# Patient Record
Sex: MALE | Race: WHITE | NOT HISPANIC OR LATINO
[De-identification: names, ages, dates, MRNs, and addresses within clinical notes are randomized per-mention and may not be internally consistent; named-entity substitution may affect disease eponyms.]

---

## 2021-06-25 ENCOUNTER — APPOINTMENT (OUTPATIENT)
Dept: OPHTHALMOLOGY | Facility: CLINIC | Age: 76
End: 2021-06-25
Payer: COMMERCIAL

## 2021-06-25 ENCOUNTER — NON-APPOINTMENT (OUTPATIENT)
Age: 76
End: 2021-06-25

## 2021-06-25 PROCEDURE — 92015 DETERMINE REFRACTIVE STATE: CPT

## 2021-06-25 PROCEDURE — 92014 COMPRE OPH EXAM EST PT 1/>: CPT

## 2021-06-25 PROCEDURE — 92134 CPTRZ OPH DX IMG PST SGM RTA: CPT

## 2021-07-07 ENCOUNTER — NON-APPOINTMENT (OUTPATIENT)
Age: 76
End: 2021-07-07

## 2021-07-07 ENCOUNTER — APPOINTMENT (OUTPATIENT)
Dept: OPHTHALMOLOGY | Facility: CLINIC | Age: 76
End: 2021-07-07
Payer: COMMERCIAL

## 2021-07-07 PROCEDURE — 99072 ADDL SUPL MATRL&STAF TM PHE: CPT

## 2021-07-07 PROCEDURE — 92014 COMPRE OPH EXAM EST PT 1/>: CPT

## 2021-07-27 ENCOUNTER — NON-APPOINTMENT (OUTPATIENT)
Age: 76
End: 2021-07-27

## 2021-07-27 ENCOUNTER — APPOINTMENT (OUTPATIENT)
Dept: OPHTHALMOLOGY | Facility: CLINIC | Age: 76
End: 2021-07-27
Payer: COMMERCIAL

## 2021-07-27 PROCEDURE — 92014 COMPRE OPH EXAM EST PT 1/>: CPT

## 2021-08-19 ENCOUNTER — APPOINTMENT (OUTPATIENT)
Dept: OPHTHALMOLOGY | Facility: CLINIC | Age: 76
End: 2021-08-19
Payer: COMMERCIAL

## 2021-08-19 ENCOUNTER — NON-APPOINTMENT (OUTPATIENT)
Age: 76
End: 2021-08-19

## 2021-08-19 PROCEDURE — 92136 OPHTHALMIC BIOMETRY: CPT

## 2021-08-19 PROCEDURE — 92014 COMPRE OPH EXAM EST PT 1/>: CPT

## 2021-08-30 ENCOUNTER — APPOINTMENT (OUTPATIENT)
Dept: OPHTHALMOLOGY | Facility: HOSPITAL | Age: 76
End: 2021-08-30
Payer: COMMERCIAL

## 2021-08-30 ENCOUNTER — NON-APPOINTMENT (OUTPATIENT)
Age: 76
End: 2021-08-30

## 2021-08-30 PROCEDURE — 66984 XCAPSL CTRC RMVL W/O ECP: CPT | Mod: LT

## 2021-08-31 ENCOUNTER — APPOINTMENT (OUTPATIENT)
Dept: OPHTHALMOLOGY | Facility: CLINIC | Age: 76
End: 2021-08-31
Payer: COMMERCIAL

## 2021-08-31 ENCOUNTER — NON-APPOINTMENT (OUTPATIENT)
Age: 76
End: 2021-08-31

## 2021-08-31 PROCEDURE — 99024 POSTOP FOLLOW-UP VISIT: CPT

## 2021-09-09 ENCOUNTER — NON-APPOINTMENT (OUTPATIENT)
Age: 76
End: 2021-09-09

## 2021-09-09 ENCOUNTER — APPOINTMENT (OUTPATIENT)
Dept: OPHTHALMOLOGY | Facility: CLINIC | Age: 76
End: 2021-09-09
Payer: COMMERCIAL

## 2021-09-09 PROCEDURE — 92014 COMPRE OPH EXAM EST PT 1/>: CPT | Mod: 24

## 2021-09-09 PROCEDURE — 92136 OPHTHALMIC BIOMETRY: CPT | Mod: 26,RT

## 2021-09-20 ENCOUNTER — APPOINTMENT (OUTPATIENT)
Dept: OPHTHALMOLOGY | Facility: HOSPITAL | Age: 76
End: 2021-09-20
Payer: COMMERCIAL

## 2021-09-20 ENCOUNTER — NON-APPOINTMENT (OUTPATIENT)
Age: 76
End: 2021-09-20

## 2021-09-20 PROCEDURE — 66984 XCAPSL CTRC RMVL W/O ECP: CPT | Mod: 79,RT

## 2021-09-21 ENCOUNTER — NON-APPOINTMENT (OUTPATIENT)
Age: 76
End: 2021-09-21

## 2021-09-21 ENCOUNTER — APPOINTMENT (OUTPATIENT)
Dept: OPHTHALMOLOGY | Facility: CLINIC | Age: 76
End: 2021-09-21
Payer: COMMERCIAL

## 2021-09-21 PROCEDURE — 99024 POSTOP FOLLOW-UP VISIT: CPT

## 2021-09-28 ENCOUNTER — NON-APPOINTMENT (OUTPATIENT)
Age: 76
End: 2021-09-28

## 2021-09-28 ENCOUNTER — APPOINTMENT (OUTPATIENT)
Dept: OPHTHALMOLOGY | Facility: CLINIC | Age: 76
End: 2021-09-28
Payer: COMMERCIAL

## 2021-09-28 PROCEDURE — 99024 POSTOP FOLLOW-UP VISIT: CPT

## 2021-11-03 PROBLEM — Z00.00 ENCOUNTER FOR PREVENTIVE HEALTH EXAMINATION: Status: ACTIVE | Noted: 2021-11-03

## 2021-11-04 ENCOUNTER — APPOINTMENT (OUTPATIENT)
Dept: OPHTHALMOLOGY | Facility: CLINIC | Age: 76
End: 2021-11-04
Payer: COMMERCIAL

## 2021-11-04 ENCOUNTER — NON-APPOINTMENT (OUTPATIENT)
Age: 76
End: 2021-11-04

## 2021-11-04 PROCEDURE — 92015 DETERMINE REFRACTIVE STATE: CPT

## 2021-11-04 PROCEDURE — 99024 POSTOP FOLLOW-UP VISIT: CPT

## 2022-01-13 ENCOUNTER — APPOINTMENT (OUTPATIENT)
Dept: OPHTHALMOLOGY | Facility: CLINIC | Age: 77
End: 2022-01-13

## 2023-03-20 ENCOUNTER — INPATIENT (INPATIENT)
Facility: HOSPITAL | Age: 78
LOS: 6 days | Discharge: HOME HEALTH | DRG: 235 | End: 2023-03-27
Attending: THORACIC SURGERY (CARDIOTHORACIC VASCULAR SURGERY) | Admitting: THORACIC SURGERY (CARDIOTHORACIC VASCULAR SURGERY)
Payer: MEDICARE

## 2023-03-20 ENCOUNTER — TRANSCRIPTION ENCOUNTER (OUTPATIENT)
Age: 78
End: 2023-03-20

## 2023-03-20 VITALS
TEMPERATURE: 99 F | OXYGEN SATURATION: 94 % | DIASTOLIC BLOOD PRESSURE: 76 MMHG | HEART RATE: 62 BPM | RESPIRATION RATE: 20 BRPM | SYSTOLIC BLOOD PRESSURE: 132 MMHG

## 2023-03-20 DIAGNOSIS — I10 ESSENTIAL (PRIMARY) HYPERTENSION: ICD-10-CM

## 2023-03-20 DIAGNOSIS — I25.10 ATHEROSCLEROTIC HEART DISEASE OF NATIVE CORONARY ARTERY WITHOUT ANGINA PECTORIS: ICD-10-CM

## 2023-03-20 DIAGNOSIS — E11.9 TYPE 2 DIABETES MELLITUS WITHOUT COMPLICATIONS: ICD-10-CM

## 2023-03-20 DIAGNOSIS — Z98.890 OTHER SPECIFIED POSTPROCEDURAL STATES: Chronic | ICD-10-CM

## 2023-03-20 DIAGNOSIS — Z90.49 ACQUIRED ABSENCE OF OTHER SPECIFIED PARTS OF DIGESTIVE TRACT: Chronic | ICD-10-CM

## 2023-03-20 DIAGNOSIS — N18.9 CHRONIC KIDNEY DISEASE, UNSPECIFIED: ICD-10-CM

## 2023-03-20 DIAGNOSIS — Z29.9 ENCOUNTER FOR PROPHYLACTIC MEASURES, UNSPECIFIED: ICD-10-CM

## 2023-03-20 DIAGNOSIS — E78.5 HYPERLIPIDEMIA, UNSPECIFIED: ICD-10-CM

## 2023-03-20 LAB
A1C WITH ESTIMATED AVERAGE GLUCOSE RESULT: 6.9 % — HIGH (ref 4–5.6)
ALBUMIN SERPL ELPH-MCNC: 3.6 G/DL — SIGNIFICANT CHANGE UP (ref 3.3–5.2)
ALP SERPL-CCNC: 58 U/L — SIGNIFICANT CHANGE UP (ref 40–120)
ALT FLD-CCNC: 15 U/L — SIGNIFICANT CHANGE UP
ANION GAP SERPL CALC-SCNC: 12 MMOL/L — SIGNIFICANT CHANGE UP (ref 5–17)
APTT BLD: 32.1 SEC — SIGNIFICANT CHANGE UP (ref 27.5–35.5)
AST SERPL-CCNC: 19 U/L — SIGNIFICANT CHANGE UP
BASE EXCESS BLDA CALC-SCNC: 0.3 MMOL/L — SIGNIFICANT CHANGE UP (ref -2–3)
BASOPHILS # BLD AUTO: 0.03 K/UL — SIGNIFICANT CHANGE UP (ref 0–0.2)
BASOPHILS NFR BLD AUTO: 0.4 % — SIGNIFICANT CHANGE UP (ref 0–2)
BILIRUB SERPL-MCNC: 0.3 MG/DL — LOW (ref 0.4–2)
BLD GP AB SCN SERPL QL: SIGNIFICANT CHANGE UP
BUN SERPL-MCNC: 13 MG/DL — SIGNIFICANT CHANGE UP (ref 8–20)
CALCIUM SERPL-MCNC: 8.3 MG/DL — LOW (ref 8.4–10.5)
CHLORIDE SERPL-SCNC: 108 MMOL/L — SIGNIFICANT CHANGE UP (ref 96–108)
CHOLEST SERPL-MCNC: 124 MG/DL — SIGNIFICANT CHANGE UP
CO2 SERPL-SCNC: 22 MMOL/L — SIGNIFICANT CHANGE UP (ref 22–29)
CREAT SERPL-MCNC: 1.08 MG/DL — SIGNIFICANT CHANGE UP (ref 0.5–1.3)
EGFR: 70 ML/MIN/1.73M2 — SIGNIFICANT CHANGE UP
EOSINOPHIL # BLD AUTO: 0.3 K/UL — SIGNIFICANT CHANGE UP (ref 0–0.5)
EOSINOPHIL NFR BLD AUTO: 4 % — SIGNIFICANT CHANGE UP (ref 0–6)
ESTIMATED AVERAGE GLUCOSE: 151 MG/DL — HIGH (ref 68–114)
GAS PNL BLDA: SIGNIFICANT CHANGE UP
GLUCOSE BLDC GLUCOMTR-MCNC: 106 MG/DL — HIGH (ref 70–99)
GLUCOSE BLDC GLUCOMTR-MCNC: 145 MG/DL — HIGH (ref 70–99)
GLUCOSE SERPL-MCNC: 135 MG/DL — HIGH (ref 70–99)
HCO3 BLDA-SCNC: 25 MMOL/L — SIGNIFICANT CHANGE UP (ref 21–28)
HCT VFR BLD CALC: 33.3 % — LOW (ref 39–50)
HDLC SERPL-MCNC: 43 MG/DL — SIGNIFICANT CHANGE UP
HGB BLD-MCNC: 10.9 G/DL — LOW (ref 13–17)
HOROWITZ INDEX BLDA+IHG-RTO: 0.21 — SIGNIFICANT CHANGE UP
IMM GRANULOCYTES NFR BLD AUTO: 0.5 % — SIGNIFICANT CHANGE UP (ref 0–0.9)
INR BLD: 1.12 RATIO — SIGNIFICANT CHANGE UP (ref 0.88–1.16)
LIPID PNL WITH DIRECT LDL SERPL: 55 MG/DL — SIGNIFICANT CHANGE UP
LYMPHOCYTES # BLD AUTO: 2.15 K/UL — SIGNIFICANT CHANGE UP (ref 1–3.3)
LYMPHOCYTES # BLD AUTO: 28.7 % — SIGNIFICANT CHANGE UP (ref 13–44)
MCHC RBC-ENTMCNC: 30.9 PG — SIGNIFICANT CHANGE UP (ref 27–34)
MCHC RBC-ENTMCNC: 32.7 GM/DL — SIGNIFICANT CHANGE UP (ref 32–36)
MCV RBC AUTO: 94.3 FL — SIGNIFICANT CHANGE UP (ref 80–100)
MONOCYTES # BLD AUTO: 0.79 K/UL — SIGNIFICANT CHANGE UP (ref 0–0.9)
MONOCYTES NFR BLD AUTO: 10.5 % — SIGNIFICANT CHANGE UP (ref 2–14)
NEUTROPHILS # BLD AUTO: 4.18 K/UL — SIGNIFICANT CHANGE UP (ref 1.8–7.4)
NEUTROPHILS NFR BLD AUTO: 55.9 % — SIGNIFICANT CHANGE UP (ref 43–77)
NON HDL CHOLESTEROL: 81 MG/DL — SIGNIFICANT CHANGE UP
NT-PROBNP SERPL-SCNC: 100 PG/ML — SIGNIFICANT CHANGE UP (ref 0–300)
PA ADP PRP-ACNC: 353 PRU — SIGNIFICANT CHANGE UP (ref 180–376)
PCO2 BLDA: 43 MMHG — SIGNIFICANT CHANGE UP (ref 35–48)
PH BLDA: 7.38 — SIGNIFICANT CHANGE UP (ref 7.35–7.45)
PLATELET # BLD AUTO: 203 K/UL — SIGNIFICANT CHANGE UP (ref 150–400)
PO2 BLDA: 85 MMHG — SIGNIFICANT CHANGE UP (ref 83–108)
POTASSIUM SERPL-MCNC: 4.4 MMOL/L — SIGNIFICANT CHANGE UP (ref 3.5–5.3)
POTASSIUM SERPL-SCNC: 4.4 MMOL/L — SIGNIFICANT CHANGE UP (ref 3.5–5.3)
PREALB SERPL-MCNC: 22 MG/DL — SIGNIFICANT CHANGE UP (ref 18–38)
PROT SERPL-MCNC: 7.2 G/DL — SIGNIFICANT CHANGE UP (ref 6.6–8.7)
PROTHROM AB SERPL-ACNC: 13 SEC — SIGNIFICANT CHANGE UP (ref 10.5–13.4)
RBC # BLD: 3.53 M/UL — LOW (ref 4.2–5.8)
RBC # FLD: 13.2 % — SIGNIFICANT CHANGE UP (ref 10.3–14.5)
SAO2 % BLDA: 98.7 % — HIGH (ref 94–98)
SODIUM SERPL-SCNC: 141 MMOL/L — SIGNIFICANT CHANGE UP (ref 135–145)
TRIGL SERPL-MCNC: 129 MG/DL — SIGNIFICANT CHANGE UP
TSH SERPL-MCNC: 0.9 UIU/ML — SIGNIFICANT CHANGE UP (ref 0.27–4.2)
WBC # BLD: 7.49 K/UL — SIGNIFICANT CHANGE UP (ref 3.8–10.5)
WBC # FLD AUTO: 7.49 K/UL — SIGNIFICANT CHANGE UP (ref 3.8–10.5)

## 2023-03-20 PROCEDURE — ZZZZZ: CPT

## 2023-03-20 PROCEDURE — 93010 ELECTROCARDIOGRAM REPORT: CPT

## 2023-03-20 PROCEDURE — 93306 TTE W/DOPPLER COMPLETE: CPT | Mod: 26

## 2023-03-20 PROCEDURE — 71045 X-RAY EXAM CHEST 1 VIEW: CPT | Mod: 26

## 2023-03-20 PROCEDURE — 93880 EXTRACRANIAL BILAT STUDY: CPT | Mod: 26

## 2023-03-20 RX ORDER — ATORVASTATIN CALCIUM 80 MG/1
0 TABLET, FILM COATED ORAL
Qty: 0 | Refills: 0 | DISCHARGE

## 2023-03-20 RX ORDER — DEXTROSE 50 % IN WATER 50 %
12.5 SYRINGE (ML) INTRAVENOUS ONCE
Refills: 0 | Status: DISCONTINUED | OUTPATIENT
Start: 2023-03-20 | End: 2023-03-21

## 2023-03-20 RX ORDER — GLUCAGON INJECTION, SOLUTION 0.5 MG/.1ML
1 INJECTION, SOLUTION SUBCUTANEOUS ONCE
Refills: 0 | Status: DISCONTINUED | OUTPATIENT
Start: 2023-03-20 | End: 2023-03-21

## 2023-03-20 RX ORDER — SODIUM CHLORIDE 9 MG/ML
3 INJECTION INTRAMUSCULAR; INTRAVENOUS; SUBCUTANEOUS EVERY 8 HOURS
Refills: 0 | Status: DISCONTINUED | OUTPATIENT
Start: 2023-03-20 | End: 2023-03-21

## 2023-03-20 RX ORDER — DEXTROSE 50 % IN WATER 50 %
15 SYRINGE (ML) INTRAVENOUS ONCE
Refills: 0 | Status: DISCONTINUED | OUTPATIENT
Start: 2023-03-20 | End: 2023-03-21

## 2023-03-20 RX ORDER — METOPROLOL TARTRATE 50 MG
12.5 TABLET ORAL
Refills: 0 | Status: DISCONTINUED | OUTPATIENT
Start: 2023-03-20 | End: 2023-03-21

## 2023-03-20 RX ORDER — TAMSULOSIN HYDROCHLORIDE 0.4 MG/1
1 CAPSULE ORAL
Qty: 0 | Refills: 0 | DISCHARGE

## 2023-03-20 RX ORDER — DEXTROSE 50 % IN WATER 50 %
25 SYRINGE (ML) INTRAVENOUS ONCE
Refills: 0 | Status: DISCONTINUED | OUTPATIENT
Start: 2023-03-20 | End: 2023-03-21

## 2023-03-20 RX ORDER — PANTOPRAZOLE SODIUM 20 MG/1
40 TABLET, DELAYED RELEASE ORAL
Refills: 0 | Status: DISCONTINUED | OUTPATIENT
Start: 2023-03-20 | End: 2023-03-21

## 2023-03-20 RX ORDER — INSULIN LISPRO 100/ML
VIAL (ML) SUBCUTANEOUS
Refills: 0 | Status: DISCONTINUED | OUTPATIENT
Start: 2023-03-20 | End: 2023-03-21

## 2023-03-20 RX ORDER — CEFUROXIME AXETIL 250 MG
1500 TABLET ORAL ONCE
Refills: 0 | Status: DISCONTINUED | OUTPATIENT
Start: 2023-03-20 | End: 2023-03-21

## 2023-03-20 RX ORDER — CHLORHEXIDINE GLUCONATE 213 G/1000ML
1 SOLUTION TOPICAL
Refills: 0 | Status: DISCONTINUED | OUTPATIENT
Start: 2023-03-20 | End: 2023-03-21

## 2023-03-20 RX ORDER — ATORVASTATIN CALCIUM 80 MG/1
20 TABLET, FILM COATED ORAL
Qty: 0 | Refills: 0 | DISCHARGE

## 2023-03-20 RX ORDER — OMEPRAZOLE 10 MG/1
1 CAPSULE, DELAYED RELEASE ORAL
Qty: 0 | Refills: 0 | DISCHARGE

## 2023-03-20 RX ORDER — CHLORHEXIDINE GLUCONATE 213 G/1000ML
15 SOLUTION TOPICAL
Refills: 0 | Status: DISCONTINUED | OUTPATIENT
Start: 2023-03-20 | End: 2023-03-21

## 2023-03-20 RX ORDER — ATORVASTATIN CALCIUM 80 MG/1
20 TABLET, FILM COATED ORAL AT BEDTIME
Refills: 0 | Status: DISCONTINUED | OUTPATIENT
Start: 2023-03-20 | End: 2023-03-21

## 2023-03-20 RX ORDER — VANCOMYCIN HCL 1 G
1000 VIAL (EA) INTRAVENOUS ONCE
Refills: 0 | Status: DISCONTINUED | OUTPATIENT
Start: 2023-03-20 | End: 2023-03-21

## 2023-03-20 RX ADMIN — CHLORHEXIDINE GLUCONATE 15 MILLILITER(S): 213 SOLUTION TOPICAL at 22:26

## 2023-03-20 RX ADMIN — ATORVASTATIN CALCIUM 20 MILLIGRAM(S): 80 TABLET, FILM COATED ORAL at 22:26

## 2023-03-20 RX ADMIN — Medication 12.5 MILLIGRAM(S): at 19:14

## 2023-03-20 RX ADMIN — CHLORHEXIDINE GLUCONATE 1 APPLICATION(S): 213 SOLUTION TOPICAL at 22:14

## 2023-03-20 RX ADMIN — SODIUM CHLORIDE 3 MILLILITER(S): 9 INJECTION INTRAMUSCULAR; INTRAVENOUS; SUBCUTANEOUS at 22:14

## 2023-03-20 NOTE — H&P ADULT - NSHPSOCIALHISTORY_GEN_ALL_CORE
Smoker: Former cigarette smoker - quit in 1892  Alcohol: Denies  Drug: Denies  Ambulatory Status: No assistive devices, drives vehicle  Upper/Lower Dentures, Glasses  Stairs: Has stairs in home  Occupation: Retired   Living situation: private home  Support System: spouse [x_] children [_x]

## 2023-03-20 NOTE — H&P ADULT - NSHPPHYSICALEXAM_GEN_ALL_CORE
Constitutional: NAD   Neck: supple, trachea midline  Respiratory: Breath sounds clear to auscultation, no accessory muscle use noted. No wheezing, rales, or rhonchi noted b/l   Cardiovascular: Regular rate, regular rhythm, normal S1, S2; no murmurs or rub   Gastrointestinal: Firm, rounded, non-tender, non distended, normal bowel sounds   Extremities: HECK x 4, no peripheral edema, no cyanosis, no clubbing    Vascular: Equal and normal pulses: 2+ peripheral pulses throughout   Neurological: A+O x 3; speech clear and intact; no gross sensory/motor deficits   Psychiatric: calm, normal mood, normal affect   Skin: warm, dry, well perfused, no rashes, b/l groins CDI

## 2023-03-20 NOTE — H&P ADULT - NSICDXPASTMEDICALHX_GEN_ALL_CORE_FT
PAST MEDICAL HISTORY:  BPH without obstruction/lower urinary tract symptoms     DM2 (diabetes mellitus, type 2)     HLD (hyperlipidemia)     HTN (hypertension)

## 2023-03-20 NOTE — H&P ADULT - PROBLEM SELECTOR PLAN 2
A1c 6.8 on 3/14. On metformin, Januvia, glipizide at home (will hold during periop course)  Strict BG control for sternal wound infection prophylaxis    Endocrine consult and diabetes education

## 2023-03-20 NOTE — PATIENT PROFILE ADULT - FALL HARM RISK - HARM RISK INTERVENTIONS

## 2023-03-20 NOTE — H&P ADULT - PROBLEM SELECTOR PLAN 1
Cardiac cath film in chart  LV ventriculogram from cath showing EF 60%  NPO after midnight for OR in AM  Continue Lopressor and Aspirin  Preop work up ordered including carotid US to assess for carotid stenosis, PFTs to eval lung function, TTE to eval EF / WMA / Valve function, and lab work including TSH, prealbumin, Hgb A1C, P2Y12, BNP, T&S, MRSA/MSSA, and UA.    Plan discussed with Dr. Goel

## 2023-03-20 NOTE — H&P ADULT - HISTORY OF PRESENT ILLNESS
Pt is a 76yo Male with PMHx of HTN, HLD, BPH, and NIDDM2 who was seen by his Cardiologist outpatient for chest pain work-up. Pt states that he has been experiencing chest pain with minimal exertion rated 1/10, dull, radiating down b/l arms, relieved by rest. Denied any other associated symptoms. Patient states he has otherwise been in good health. Pt is s/p elective LHC/RHC at [facility] 3/20 showing MVD. Pt transferred to [facility] for CABG evaluation. Pt arrived to [facility] via EMS transport in no apparent distress. Pt currently denies CP, SOB at rest, orthopnea, palpitations, HA/dizziness, numbness/tingling in extremities, abdominal pain, N/V/D/C, or any other acute complaints.

## 2023-03-20 NOTE — H&P ADULT - PROBLEM SELECTOR PLAN 3
Pre-op testing from 3/14 with chemistry showing sCr 1.35  Unsure of patient baseline renal fxn  F/u renal panel

## 2023-03-20 NOTE — H&P ADULT - ASSESSMENT
Pt is a 76yo Male with PMHx of HTN, HLD, BPH, and NIDDM2 who was seen by his Cardiologist outpatient for chest pain work-up. Pt states that he has been experiencing chest pain with minimal exertion rated 1/10, dull, radiating down b/l arms, relieved by rest. Denied any other associated symptoms. Patient states he has otherwise been in good health. Pt is s/p elective LHC/RHC at [facility] 3/20 showing MVD. Pt transferred to [facility] for CABG evaluation.

## 2023-03-21 ENCOUNTER — APPOINTMENT (OUTPATIENT)
Dept: CARDIOTHORACIC SURGERY | Facility: HOSPITAL | Age: 78
End: 2023-03-21

## 2023-03-21 LAB
ABO RH CONFIRMATION: SIGNIFICANT CHANGE UP
ALBUMIN SERPL ELPH-MCNC: 3.5 G/DL — SIGNIFICANT CHANGE UP (ref 3.3–5.2)
ALBUMIN SERPL ELPH-MCNC: 3.8 G/DL — SIGNIFICANT CHANGE UP (ref 3.3–5.2)
ALP SERPL-CCNC: 43 U/L — SIGNIFICANT CHANGE UP (ref 40–120)
ALP SERPL-CCNC: 60 U/L — SIGNIFICANT CHANGE UP (ref 40–120)
ALT FLD-CCNC: 12 U/L — SIGNIFICANT CHANGE UP
ALT FLD-CCNC: 15 U/L — SIGNIFICANT CHANGE UP
ANION GAP SERPL CALC-SCNC: 13 MMOL/L — SIGNIFICANT CHANGE UP (ref 5–17)
ANION GAP SERPL CALC-SCNC: 16 MMOL/L — SIGNIFICANT CHANGE UP (ref 5–17)
APPEARANCE UR: CLEAR — SIGNIFICANT CHANGE UP
APTT BLD: 35 SEC — SIGNIFICANT CHANGE UP (ref 27.5–35.5)
AST SERPL-CCNC: 18 U/L — SIGNIFICANT CHANGE UP
AST SERPL-CCNC: 25 U/L — SIGNIFICANT CHANGE UP
BASE EXCESS BLDA CALC-SCNC: -1 MMOL/L — SIGNIFICANT CHANGE UP (ref -2–3)
BASE EXCESS BLDA CALC-SCNC: -1.1 MMOL/L — SIGNIFICANT CHANGE UP (ref -2–3)
BASE EXCESS BLDA CALC-SCNC: -1.1 MMOL/L — SIGNIFICANT CHANGE UP (ref -2–3)
BASE EXCESS BLDA CALC-SCNC: -2.9 MMOL/L — LOW (ref -2–3)
BASE EXCESS BLDA CALC-SCNC: -5.3 MMOL/L — LOW (ref -2–3)
BASE EXCESS BLDA CALC-SCNC: 0 MMOL/L — SIGNIFICANT CHANGE UP (ref -2–3)
BASE EXCESS BLDV CALC-SCNC: -0.2 MMOL/L — SIGNIFICANT CHANGE UP (ref -2–3)
BASE EXCESS BLDV CALC-SCNC: -1.1 MMOL/L — SIGNIFICANT CHANGE UP (ref -2–3)
BASE EXCESS BLDV CALC-SCNC: -1.3 MMOL/L — SIGNIFICANT CHANGE UP (ref -2–3)
BASE EXCESS BLDV CALC-SCNC: -3.8 MMOL/L — LOW (ref -2–3)
BASOPHILS # BLD AUTO: 0.04 K/UL — SIGNIFICANT CHANGE UP (ref 0–0.2)
BASOPHILS # BLD AUTO: 0.05 K/UL — SIGNIFICANT CHANGE UP (ref 0–0.2)
BASOPHILS NFR BLD AUTO: 0.3 % — SIGNIFICANT CHANGE UP (ref 0–2)
BASOPHILS NFR BLD AUTO: 0.5 % — SIGNIFICANT CHANGE UP (ref 0–2)
BILIRUB SERPL-MCNC: 0.6 MG/DL — SIGNIFICANT CHANGE UP (ref 0.4–2)
BILIRUB SERPL-MCNC: 1.1 MG/DL — SIGNIFICANT CHANGE UP (ref 0.4–2)
BILIRUB UR-MCNC: NEGATIVE — SIGNIFICANT CHANGE UP
BUN SERPL-MCNC: 11.1 MG/DL — SIGNIFICANT CHANGE UP (ref 8–20)
BUN SERPL-MCNC: 13 MG/DL — SIGNIFICANT CHANGE UP (ref 8–20)
CA-I BLDA-SCNC: 0.88 MMOL/L — LOW (ref 1.15–1.33)
CA-I BLDA-SCNC: 0.98 MMOL/L — LOW (ref 1.15–1.33)
CA-I BLDA-SCNC: 0.99 MMOL/L — LOW (ref 1.15–1.33)
CA-I BLDA-SCNC: 1.16 MMOL/L — SIGNIFICANT CHANGE UP (ref 1.15–1.33)
CA-I BLDA-SCNC: 1.2 MMOL/L — SIGNIFICANT CHANGE UP (ref 1.15–1.33)
CA-I BLDA-SCNC: 1.32 MMOL/L — SIGNIFICANT CHANGE UP (ref 1.15–1.33)
CA-I SERPL-SCNC: 0.92 MMOL/L — LOW (ref 1.15–1.33)
CA-I SERPL-SCNC: 0.98 MMOL/L — LOW (ref 1.15–1.33)
CA-I SERPL-SCNC: 1 MMOL/L — LOW (ref 1.15–1.33)
CA-I SERPL-SCNC: 1.24 MMOL/L — SIGNIFICANT CHANGE UP (ref 1.15–1.33)
CALCIUM SERPL-MCNC: 8.6 MG/DL — SIGNIFICANT CHANGE UP (ref 8.4–10.5)
CALCIUM SERPL-MCNC: 8.9 MG/DL — SIGNIFICANT CHANGE UP (ref 8.4–10.5)
CHLORIDE BLDA-SCNC: 107 MMOL/L — SIGNIFICANT CHANGE UP (ref 96–108)
CHLORIDE BLDA-SCNC: 108 MMOL/L — SIGNIFICANT CHANGE UP (ref 96–108)
CHLORIDE BLDA-SCNC: 109 MMOL/L — HIGH (ref 96–108)
CHLORIDE BLDA-SCNC: 109 MMOL/L — HIGH (ref 96–108)
CHLORIDE BLDA-SCNC: 110 MMOL/L — HIGH (ref 96–108)
CHLORIDE BLDA-SCNC: 111 MMOL/L — HIGH (ref 96–108)
CHLORIDE BLDV-SCNC: 107 MMOL/L — SIGNIFICANT CHANGE UP (ref 96–108)
CHLORIDE BLDV-SCNC: 108 MMOL/L — SIGNIFICANT CHANGE UP (ref 96–108)
CHLORIDE BLDV-SCNC: 109 MMOL/L — HIGH (ref 96–108)
CHLORIDE BLDV-SCNC: 109 MMOL/L — HIGH (ref 96–108)
CHLORIDE SERPL-SCNC: 105 MMOL/L — SIGNIFICANT CHANGE UP (ref 96–108)
CHLORIDE SERPL-SCNC: 106 MMOL/L — SIGNIFICANT CHANGE UP (ref 96–108)
CK MB CFR SERPL CALC: 12.3 NG/ML — HIGH (ref 0–6.7)
CK SERPL-CCNC: 311 U/L — HIGH (ref 30–200)
CO2 SERPL-SCNC: 21 MMOL/L — LOW (ref 22–29)
CO2 SERPL-SCNC: 22 MMOL/L — SIGNIFICANT CHANGE UP (ref 22–29)
COHGB MFR BLDA: 0.1 % — SIGNIFICANT CHANGE UP
COHGB MFR BLDA: 0.3 % — SIGNIFICANT CHANGE UP
COHGB MFR BLDA: 0.4 % — SIGNIFICANT CHANGE UP
COHGB MFR BLDA: 0.6 % — SIGNIFICANT CHANGE UP
COHGB MFR BLDA: 0.9 % — SIGNIFICANT CHANGE UP
COHGB MFR BLDA: 0.9 % — SIGNIFICANT CHANGE UP
COHGB MFR BLDV: 0.9 % — SIGNIFICANT CHANGE UP
COHGB MFR BLDV: 1.3 % — SIGNIFICANT CHANGE UP
COHGB MFR BLDV: 1.4 % — SIGNIFICANT CHANGE UP
COLOR SPEC: YELLOW — SIGNIFICANT CHANGE UP
CREAT SERPL-MCNC: 0.89 MG/DL — SIGNIFICANT CHANGE UP (ref 0.5–1.3)
CREAT SERPL-MCNC: 1.11 MG/DL — SIGNIFICANT CHANGE UP (ref 0.5–1.3)
DIFF PNL FLD: ABNORMAL
EGFR: 68 ML/MIN/1.73M2 — SIGNIFICANT CHANGE UP
EGFR: 87 ML/MIN/1.73M2 — SIGNIFICANT CHANGE UP
EOSINOPHIL # BLD AUTO: 0.12 K/UL — SIGNIFICANT CHANGE UP (ref 0–0.5)
EOSINOPHIL # BLD AUTO: 0.35 K/UL — SIGNIFICANT CHANGE UP (ref 0–0.5)
EOSINOPHIL NFR BLD AUTO: 0.6 % — SIGNIFICANT CHANGE UP (ref 0–6)
EOSINOPHIL NFR BLD AUTO: 4.6 % — SIGNIFICANT CHANGE UP (ref 0–6)
GAS PNL BLDA: SIGNIFICANT CHANGE UP
GAS PNL BLDV: 137 MMOL/L — SIGNIFICANT CHANGE UP (ref 136–145)
GAS PNL BLDV: 138 MMOL/L — SIGNIFICANT CHANGE UP (ref 136–145)
GAS PNL BLDV: 138 MMOL/L — SIGNIFICANT CHANGE UP (ref 136–145)
GAS PNL BLDV: 139 MMOL/L — SIGNIFICANT CHANGE UP (ref 136–145)
GAS PNL BLDV: SIGNIFICANT CHANGE UP
GLUCOSE BLDA-MCNC: 130 MG/DL — HIGH (ref 70–99)
GLUCOSE BLDA-MCNC: 142 MG/DL — HIGH (ref 70–99)
GLUCOSE BLDA-MCNC: 144 MG/DL — HIGH (ref 70–99)
GLUCOSE BLDA-MCNC: 148 MG/DL — HIGH (ref 70–99)
GLUCOSE BLDA-MCNC: 149 MG/DL — HIGH (ref 70–99)
GLUCOSE BLDA-MCNC: 151 MG/DL — HIGH (ref 70–99)
GLUCOSE BLDC GLUCOMTR-MCNC: 105 MG/DL — HIGH (ref 70–99)
GLUCOSE BLDC GLUCOMTR-MCNC: 106 MG/DL — HIGH (ref 70–99)
GLUCOSE BLDC GLUCOMTR-MCNC: 114 MG/DL — HIGH (ref 70–99)
GLUCOSE BLDC GLUCOMTR-MCNC: 148 MG/DL — HIGH (ref 70–99)
GLUCOSE BLDC GLUCOMTR-MCNC: 152 MG/DL — HIGH (ref 70–99)
GLUCOSE BLDC GLUCOMTR-MCNC: 152 MG/DL — HIGH (ref 70–99)
GLUCOSE BLDC GLUCOMTR-MCNC: 155 MG/DL — HIGH (ref 70–99)
GLUCOSE BLDC GLUCOMTR-MCNC: 174 MG/DL — HIGH (ref 70–99)
GLUCOSE BLDC GLUCOMTR-MCNC: 192 MG/DL — HIGH (ref 70–99)
GLUCOSE BLDV-MCNC: 131 MG/DL — HIGH (ref 70–99)
GLUCOSE BLDV-MCNC: 141 MG/DL — HIGH (ref 70–99)
GLUCOSE BLDV-MCNC: 147 MG/DL — HIGH (ref 70–99)
GLUCOSE BLDV-MCNC: 172 MG/DL — HIGH (ref 70–99)
GLUCOSE SERPL-MCNC: 138 MG/DL — HIGH (ref 70–99)
GLUCOSE SERPL-MCNC: 156 MG/DL — HIGH (ref 70–99)
GLUCOSE UR QL: NEGATIVE MG/DL — SIGNIFICANT CHANGE UP
HCO3 BLDA-SCNC: 20 MMOL/L — LOW (ref 21–28)
HCO3 BLDA-SCNC: 23 MMOL/L — SIGNIFICANT CHANGE UP (ref 21–28)
HCO3 BLDA-SCNC: 25 MMOL/L — SIGNIFICANT CHANGE UP (ref 21–28)
HCO3 BLDV-SCNC: 22 MMOL/L — SIGNIFICANT CHANGE UP (ref 22–29)
HCO3 BLDV-SCNC: 24 MMOL/L — SIGNIFICANT CHANGE UP (ref 22–29)
HCO3 BLDV-SCNC: 24 MMOL/L — SIGNIFICANT CHANGE UP (ref 22–29)
HCO3 BLDV-SCNC: 25 MMOL/L — SIGNIFICANT CHANGE UP (ref 22–29)
HCT VFR BLD CALC: 29.3 % — LOW (ref 39–50)
HCT VFR BLD CALC: 35.7 % — LOW (ref 39–50)
HCT VFR BLDA CALC: 20 % — SIGNIFICANT CHANGE UP
HCT VFR BLDA CALC: 20 % — SIGNIFICANT CHANGE UP
HCT VFR BLDA CALC: 22 % — SIGNIFICANT CHANGE UP
HCT VFR BLDA CALC: 22 % — SIGNIFICANT CHANGE UP
HCT VFR BLDA CALC: 24 % — SIGNIFICANT CHANGE UP
HCT VFR BLDA CALC: 25 % — SIGNIFICANT CHANGE UP
HCT VFR BLDA CALC: 25 % — SIGNIFICANT CHANGE UP
HCT VFR BLDA CALC: 32 % — SIGNIFICANT CHANGE UP
HCT VFR BLDA CALC: 33 % — SIGNIFICANT CHANGE UP
HCT VFR BLDA CALC: 34 % — SIGNIFICANT CHANGE UP
HGB BLD CALC-MCNC: 10.9 G/DL — LOW (ref 12.6–17.4)
HGB BLD CALC-MCNC: 7.3 G/DL — LOW (ref 12.6–17.4)
HGB BLD CALC-MCNC: 8.1 G/DL — LOW (ref 12.6–17.4)
HGB BLD CALC-MCNC: <6.9 G/DL — CRITICAL LOW (ref 12.6–17.4)
HGB BLD-MCNC: 10.5 G/DL — LOW (ref 13–17)
HGB BLD-MCNC: 11.7 G/DL — LOW (ref 13–17)
HGB BLDA-MCNC: 10.6 G/DL — LOW (ref 12.6–17.4)
HGB BLDA-MCNC: 11.4 G/DL — LOW (ref 12.6–17.4)
HGB BLDA-MCNC: 7.4 G/DL — LOW (ref 12.6–17.4)
HGB BLDA-MCNC: 8.3 G/DL — LOW (ref 12.6–17.4)
HGB BLDA-MCNC: 8.3 G/DL — LOW (ref 12.6–17.4)
HGB BLDA-MCNC: <6.9 G/DL — CRITICAL LOW (ref 12.6–17.4)
HOROWITZ INDEX BLDV+IHG-RTO: SIGNIFICANT CHANGE UP
IMM GRANULOCYTES NFR BLD AUTO: 0.5 % — SIGNIFICANT CHANGE UP (ref 0–0.9)
IMM GRANULOCYTES NFR BLD AUTO: 1.1 % — HIGH (ref 0–0.9)
INR BLD: 1.34 RATIO — HIGH (ref 0.88–1.16)
KETONES UR-MCNC: NEGATIVE — SIGNIFICANT CHANGE UP
LACTATE BLDA-MCNC: 1 MMOL/L — SIGNIFICANT CHANGE UP (ref 0.5–2)
LACTATE BLDA-MCNC: 1.1 MMOL/L — SIGNIFICANT CHANGE UP (ref 0.5–2)
LACTATE BLDA-MCNC: 1.7 MMOL/L — SIGNIFICANT CHANGE UP (ref 0.5–2)
LACTATE BLDA-MCNC: 1.7 MMOL/L — SIGNIFICANT CHANGE UP (ref 0.5–2)
LACTATE BLDA-MCNC: 1.8 MMOL/L — SIGNIFICANT CHANGE UP (ref 0.5–2)
LACTATE BLDA-MCNC: 2.2 MMOL/L — HIGH (ref 0.5–2)
LACTATE BLDV-MCNC: 1.4 MMOL/L — SIGNIFICANT CHANGE UP (ref 0.5–2)
LACTATE BLDV-MCNC: 1.6 MMOL/L — SIGNIFICANT CHANGE UP (ref 0.5–2)
LACTATE BLDV-MCNC: 1.8 MMOL/L — SIGNIFICANT CHANGE UP (ref 0.5–2)
LACTATE BLDV-MCNC: 2 MMOL/L — SIGNIFICANT CHANGE UP (ref 0.5–2)
LEUKOCYTE ESTERASE UR-ACNC: NEGATIVE — SIGNIFICANT CHANGE UP
LYMPHOCYTES # BLD AUTO: 1.92 K/UL — SIGNIFICANT CHANGE UP (ref 1–3.3)
LYMPHOCYTES # BLD AUTO: 1.96 K/UL — SIGNIFICANT CHANGE UP (ref 1–3.3)
LYMPHOCYTES # BLD AUTO: 25.6 % — SIGNIFICANT CHANGE UP (ref 13–44)
LYMPHOCYTES # BLD AUTO: 9.6 % — LOW (ref 13–44)
MAGNESIUM SERPL-MCNC: 2.4 MG/DL — SIGNIFICANT CHANGE UP (ref 1.8–2.6)
MCHC RBC-ENTMCNC: 30.8 PG — SIGNIFICANT CHANGE UP (ref 27–34)
MCHC RBC-ENTMCNC: 32.8 GM/DL — SIGNIFICANT CHANGE UP (ref 32–36)
MCHC RBC-ENTMCNC: 32.9 PG — SIGNIFICANT CHANGE UP (ref 27–34)
MCHC RBC-ENTMCNC: 35.8 GM/DL — SIGNIFICANT CHANGE UP (ref 32–36)
MCV RBC AUTO: 91.8 FL — SIGNIFICANT CHANGE UP (ref 80–100)
MCV RBC AUTO: 93.9 FL — SIGNIFICANT CHANGE UP (ref 80–100)
METHGB MFR BLDA: 0.4 % — SIGNIFICANT CHANGE UP
METHGB MFR BLDA: 0.6 % — SIGNIFICANT CHANGE UP
METHGB MFR BLDA: 0.7 % — SIGNIFICANT CHANGE UP
METHGB MFR BLDA: 0.8 % — SIGNIFICANT CHANGE UP
METHGB MFR BLDA: 1.1 % — SIGNIFICANT CHANGE UP
METHGB MFR BLDA: 1.2 % — SIGNIFICANT CHANGE UP
METHGB MFR BLDV: 0.2 % — SIGNIFICANT CHANGE UP
METHGB MFR BLDV: 0.4 % — SIGNIFICANT CHANGE UP
METHGB MFR BLDV: 0.7 % — SIGNIFICANT CHANGE UP
MONOCYTES # BLD AUTO: 0.77 K/UL — SIGNIFICANT CHANGE UP (ref 0–0.9)
MONOCYTES # BLD AUTO: 1.08 K/UL — HIGH (ref 0–0.9)
MONOCYTES NFR BLD AUTO: 10.1 % — SIGNIFICANT CHANGE UP (ref 2–14)
MONOCYTES NFR BLD AUTO: 5.4 % — SIGNIFICANT CHANGE UP (ref 2–14)
MRSA PCR RESULT.: SIGNIFICANT CHANGE UP
NEUTROPHILS # BLD AUTO: 16.56 K/UL — HIGH (ref 1.8–7.4)
NEUTROPHILS # BLD AUTO: 4.49 K/UL — SIGNIFICANT CHANGE UP (ref 1.8–7.4)
NEUTROPHILS NFR BLD AUTO: 58.7 % — SIGNIFICANT CHANGE UP (ref 43–77)
NEUTROPHILS NFR BLD AUTO: 83 % — HIGH (ref 43–77)
NITRITE UR-MCNC: NEGATIVE — SIGNIFICANT CHANGE UP
OXYHGB MFR BLDA: 97 % — HIGH (ref 90–95)
OXYHGB MFR BLDA: 98 % — HIGH (ref 90–95)
PCO2 BLDA: 32 MMHG — LOW (ref 35–48)
PCO2 BLDA: 34 MMHG — LOW (ref 35–48)
PCO2 BLDA: 35 MMHG — SIGNIFICANT CHANGE UP (ref 35–48)
PCO2 BLDA: 38 MMHG — SIGNIFICANT CHANGE UP (ref 35–48)
PCO2 BLDA: 40 MMHG — SIGNIFICANT CHANGE UP (ref 35–48)
PCO2 BLDA: 45 MMHG — SIGNIFICANT CHANGE UP (ref 35–48)
PCO2 BLDV: 37 MMHG — LOW (ref 42–55)
PCO2 BLDV: 38 MMHG — LOW (ref 42–55)
PCO2 BLDV: 41 MMHG — LOW (ref 42–55)
PCO2 BLDV: 42 MMHG — SIGNIFICANT CHANGE UP (ref 42–55)
PH BLDA: 7.32 — LOW (ref 7.35–7.45)
PH BLDA: 7.34 — LOW (ref 7.35–7.45)
PH BLDA: 7.4 — SIGNIFICANT CHANGE UP (ref 7.35–7.45)
PH BLDA: 7.43 — SIGNIFICANT CHANGE UP (ref 7.35–7.45)
PH BLDA: 7.44 — SIGNIFICANT CHANGE UP (ref 7.35–7.45)
PH BLDA: 7.46 — HIGH (ref 7.35–7.45)
PH BLDV: 7.33 — SIGNIFICANT CHANGE UP (ref 7.32–7.43)
PH BLDV: 7.39 — SIGNIFICANT CHANGE UP (ref 7.32–7.43)
PH BLDV: 7.4 — SIGNIFICANT CHANGE UP (ref 7.32–7.43)
PH BLDV: 7.41 — SIGNIFICANT CHANGE UP (ref 7.32–7.43)
PH UR: 6 — SIGNIFICANT CHANGE UP (ref 5–8)
PLATELET # BLD AUTO: 209 K/UL — SIGNIFICANT CHANGE UP (ref 150–400)
PLATELET # BLD AUTO: 226 K/UL — SIGNIFICANT CHANGE UP (ref 150–400)
PO2 BLDA: 371 MMHG — HIGH (ref 83–108)
PO2 BLDA: 443 MMHG — HIGH (ref 83–108)
PO2 BLDA: 476 MMHG — HIGH (ref 83–108)
PO2 BLDA: >496 MMHG — HIGH (ref 83–108)
PO2 BLDV: 44 MMHG — SIGNIFICANT CHANGE UP (ref 25–45)
PO2 BLDV: 46 MMHG — HIGH (ref 25–45)
PO2 BLDV: 47 MMHG — HIGH (ref 25–45)
PO2 BLDV: 51 MMHG — HIGH (ref 25–45)
POTASSIUM BLDA-SCNC: 4.2 MMOL/L — SIGNIFICANT CHANGE UP (ref 3.5–5.1)
POTASSIUM BLDA-SCNC: 4.2 MMOL/L — SIGNIFICANT CHANGE UP (ref 3.5–5.1)
POTASSIUM BLDA-SCNC: 4.3 MMOL/L — SIGNIFICANT CHANGE UP (ref 3.5–5.1)
POTASSIUM BLDA-SCNC: 4.5 MMOL/L — SIGNIFICANT CHANGE UP (ref 3.5–5.1)
POTASSIUM BLDA-SCNC: 4.7 MMOL/L — SIGNIFICANT CHANGE UP (ref 3.5–5.1)
POTASSIUM BLDA-SCNC: 5.6 MMOL/L — HIGH (ref 3.5–5.1)
POTASSIUM BLDV-SCNC: 4 MMOL/L — SIGNIFICANT CHANGE UP (ref 3.5–5.1)
POTASSIUM BLDV-SCNC: 4.6 MMOL/L — SIGNIFICANT CHANGE UP (ref 3.5–5.1)
POTASSIUM BLDV-SCNC: 4.8 MMOL/L — SIGNIFICANT CHANGE UP (ref 3.5–5.1)
POTASSIUM BLDV-SCNC: 5.2 MMOL/L — HIGH (ref 3.5–5.1)
POTASSIUM SERPL-MCNC: 4 MMOL/L — SIGNIFICANT CHANGE UP (ref 3.5–5.3)
POTASSIUM SERPL-MCNC: 4.4 MMOL/L — SIGNIFICANT CHANGE UP (ref 3.5–5.3)
POTASSIUM SERPL-SCNC: 4 MMOL/L — SIGNIFICANT CHANGE UP (ref 3.5–5.3)
POTASSIUM SERPL-SCNC: 4.4 MMOL/L — SIGNIFICANT CHANGE UP (ref 3.5–5.3)
PROT SERPL-MCNC: 5.9 G/DL — LOW (ref 6.6–8.7)
PROT SERPL-MCNC: 7.5 G/DL — SIGNIFICANT CHANGE UP (ref 6.6–8.7)
PROT UR-MCNC: 30 MG/DL
PROTHROM AB SERPL-ACNC: 15.6 SEC — HIGH (ref 10.5–13.4)
RBC # BLD: 3.19 M/UL — LOW (ref 4.2–5.8)
RBC # BLD: 3.8 M/UL — LOW (ref 4.2–5.8)
RBC # FLD: 13 % — SIGNIFICANT CHANGE UP (ref 10.3–14.5)
RBC # FLD: 13 % — SIGNIFICANT CHANGE UP (ref 10.3–14.5)
RBC CASTS # UR COMP ASSIST: SIGNIFICANT CHANGE UP /HPF (ref 0–4)
S AUREUS DNA NOSE QL NAA+PROBE: SIGNIFICANT CHANGE UP
SAO2 % BLDA: 98.7 % — HIGH (ref 94–98)
SAO2 % BLDA: 99.1 % — HIGH (ref 94–98)
SAO2 % BLDA: 99.4 % — HIGH (ref 94–98)
SAO2 % BLDA: 99.6 % — HIGH (ref 94–98)
SAO2 % BLDA: 99.6 % — HIGH (ref 94–98)
SAO2 % BLDA: 99.8 % — HIGH (ref 94–98)
SAO2 % BLDV: 78.2 % — SIGNIFICANT CHANGE UP
SAO2 % BLDV: 82.6 % — SIGNIFICANT CHANGE UP (ref 67–88)
SAO2 % BLDV: 84.8 % — SIGNIFICANT CHANGE UP (ref 67–88)
SAO2 % BLDV: 87.8 % — SIGNIFICANT CHANGE UP (ref 67–88)
SODIUM BLDA-SCNC: 137 MMOL/L — SIGNIFICANT CHANGE UP (ref 136–145)
SODIUM BLDA-SCNC: 137 MMOL/L — SIGNIFICANT CHANGE UP (ref 136–145)
SODIUM BLDA-SCNC: 138 MMOL/L — SIGNIFICANT CHANGE UP (ref 136–145)
SODIUM BLDA-SCNC: 139 MMOL/L — SIGNIFICANT CHANGE UP (ref 136–145)
SODIUM SERPL-SCNC: 141 MMOL/L — SIGNIFICANT CHANGE UP (ref 135–145)
SODIUM SERPL-SCNC: 142 MMOL/L — SIGNIFICANT CHANGE UP (ref 135–145)
SP GR SPEC: 1.01 — SIGNIFICANT CHANGE UP (ref 1.01–1.02)
T4 FREE SERPL-MCNC: 1 NG/DL — SIGNIFICANT CHANGE UP (ref 0.9–1.8)
TROPONIN T SERPL-MCNC: 0.22 NG/ML — HIGH (ref 0–0.06)
UROBILINOGEN FLD QL: NEGATIVE MG/DL — SIGNIFICANT CHANGE UP
WBC # BLD: 19.95 K/UL — HIGH (ref 3.8–10.5)
WBC # BLD: 7.65 K/UL — SIGNIFICANT CHANGE UP (ref 3.8–10.5)
WBC # FLD AUTO: 19.95 K/UL — HIGH (ref 3.8–10.5)
WBC # FLD AUTO: 7.65 K/UL — SIGNIFICANT CHANGE UP (ref 3.8–10.5)
WBC UR QL: SIGNIFICANT CHANGE UP /HPF (ref 0–5)

## 2023-03-21 PROCEDURE — 33533 CABG ARTERIAL SINGLE: CPT | Mod: AS

## 2023-03-21 PROCEDURE — 33518 CABG ARTERY-VEIN TWO: CPT

## 2023-03-21 PROCEDURE — 71045 X-RAY EXAM CHEST 1 VIEW: CPT | Mod: 26,76

## 2023-03-21 PROCEDURE — 93010 ELECTROCARDIOGRAM REPORT: CPT

## 2023-03-21 PROCEDURE — 33518 CABG ARTERY-VEIN TWO: CPT | Mod: AS

## 2023-03-21 PROCEDURE — 99291 CRITICAL CARE FIRST HOUR: CPT

## 2023-03-21 PROCEDURE — 33508 ENDOSCOPIC VEIN HARVEST: CPT | Mod: 59

## 2023-03-21 PROCEDURE — 33533 CABG ARTERIAL SINGLE: CPT

## 2023-03-21 DEVICE — CATH THERMAL OXI SWAN GANZ DILUTION 7.5FR: Type: IMPLANTABLE DEVICE | Status: FUNCTIONAL

## 2023-03-21 DEVICE — PACING WIRE BLUE DARK 2-0 24" BB-1 SKS-3: Type: IMPLANTABLE DEVICE | Status: FUNCTIONAL

## 2023-03-21 DEVICE — PACING WIRE ORANGE M-25 WINGED WIRE 37MM X 89MM: Type: IMPLANTABLE DEVICE | Status: FUNCTIONAL

## 2023-03-21 DEVICE — SURGICEL NU-KNIT 6 X 9": Type: IMPLANTABLE DEVICE | Status: FUNCTIONAL

## 2023-03-21 DEVICE — CANNULA ATRASUMP 1/4" X 38CM: Type: IMPLANTABLE DEVICE | Status: FUNCTIONAL

## 2023-03-21 DEVICE — PACING WIRE CLEAR 0 24" SC-2 CV-24: Type: IMPLANTABLE DEVICE | Status: FUNCTIONAL

## 2023-03-21 DEVICE — CANNULA VENOUS 2 STAGE OPEN LIGHTHOUSE TIP 32-40FR X 1/2" NON-VENTED: Type: IMPLANTABLE DEVICE | Status: FUNCTIONAL

## 2023-03-21 DEVICE — CANNULA ANTEGRADE CARDIOPLEGIA 14 GA STRL: Type: IMPLANTABLE DEVICE | Status: FUNCTIONAL

## 2023-03-21 DEVICE — CANNULA VESSEL 3MM BLUNT TIP CLEAR 1-WAY VALVE: Type: IMPLANTABLE DEVICE | Status: FUNCTIONAL

## 2023-03-21 DEVICE — KIT CVC 2LUM MAC 9FR CHG: Type: IMPLANTABLE DEVICE | Status: FUNCTIONAL

## 2023-03-21 DEVICE — LIGATING CLIPS WECK HORIZON SMALL-WIDE (RED) 24: Type: IMPLANTABLE DEVICE | Status: FUNCTIONAL

## 2023-03-21 DEVICE — LIGATING CLIPS WECK HORIZON MEDIUM (BLUE) 24: Type: IMPLANTABLE DEVICE | Status: FUNCTIONAL

## 2023-03-21 DEVICE — KIT A-LINE 1LUM 20G X 12CM SAFE KIT: Type: IMPLANTABLE DEVICE | Status: FUNCTIONAL

## 2023-03-21 DEVICE — CANNULA IMA 1MM BLUNT TIP: Type: IMPLANTABLE DEVICE | Status: FUNCTIONAL

## 2023-03-21 DEVICE — CANNULA ARTERIAL SOFT-FLOW 21FR EXTENDED VENTED: Type: IMPLANTABLE DEVICE | Status: FUNCTIONAL

## 2023-03-21 DEVICE — LIGATING CLIPS WECK HORIZON LARGE (ORANGE) 6: Type: IMPLANTABLE DEVICE | Status: FUNCTIONAL

## 2023-03-21 DEVICE — CHEST DRAIN PLEUR-EVAC 32FR STRAIGHT: Type: IMPLANTABLE DEVICE | Status: FUNCTIONAL

## 2023-03-21 DEVICE — TISSEEL 10ML: Type: IMPLANTABLE DEVICE | Status: FUNCTIONAL

## 2023-03-21 DEVICE — SURGICEL FIBRILLAR 4 X 4": Type: IMPLANTABLE DEVICE | Status: FUNCTIONAL

## 2023-03-21 DEVICE — FLOSEAL FAST PREP 10ML: Type: IMPLANTABLE DEVICE | Status: FUNCTIONAL

## 2023-03-21 RX ORDER — POTASSIUM CHLORIDE 20 MEQ
10 PACKET (EA) ORAL
Refills: 0 | Status: COMPLETED | OUTPATIENT
Start: 2023-03-21 | End: 2023-03-21

## 2023-03-21 RX ORDER — TAMSULOSIN HYDROCHLORIDE 0.4 MG/1
0.4 CAPSULE ORAL AT BEDTIME
Refills: 0 | Status: DISCONTINUED | OUTPATIENT
Start: 2023-03-22 | End: 2023-03-27

## 2023-03-21 RX ORDER — CHLORHEXIDINE GLUCONATE 213 G/1000ML
1 SOLUTION TOPICAL
Refills: 0 | Status: DISCONTINUED | OUTPATIENT
Start: 2023-03-21 | End: 2023-03-24

## 2023-03-21 RX ORDER — ACETAMINOPHEN 500 MG
1000 TABLET ORAL ONCE
Refills: 0 | Status: COMPLETED | OUTPATIENT
Start: 2023-03-21 | End: 2023-03-21

## 2023-03-21 RX ORDER — ASPIRIN/CALCIUM CARB/MAGNESIUM 324 MG
81 TABLET ORAL ONCE
Refills: 0 | Status: COMPLETED | OUTPATIENT
Start: 2023-03-21 | End: 2023-03-21

## 2023-03-21 RX ORDER — NICARDIPINE HYDROCHLORIDE 30 MG/1
5 CAPSULE, EXTENDED RELEASE ORAL
Qty: 40 | Refills: 0 | Status: DISCONTINUED | OUTPATIENT
Start: 2023-03-21 | End: 2023-03-22

## 2023-03-21 RX ORDER — GLUCAGON INJECTION, SOLUTION 0.5 MG/.1ML
1 INJECTION, SOLUTION SUBCUTANEOUS ONCE
Refills: 0 | Status: DISCONTINUED | OUTPATIENT
Start: 2023-03-21 | End: 2023-03-27

## 2023-03-21 RX ORDER — DEXTROSE 50 % IN WATER 50 %
50 SYRINGE (ML) INTRAVENOUS
Refills: 0 | Status: DISCONTINUED | OUTPATIENT
Start: 2023-03-21 | End: 2023-03-24

## 2023-03-21 RX ORDER — POVIDONE-IODINE 5 %
1 AEROSOL (ML) TOPICAL ONCE
Refills: 0 | Status: COMPLETED | OUTPATIENT
Start: 2023-03-21 | End: 2023-03-21

## 2023-03-21 RX ORDER — ASPIRIN/CALCIUM CARB/MAGNESIUM 324 MG
81 TABLET ORAL DAILY
Refills: 0 | Status: DISCONTINUED | OUTPATIENT
Start: 2023-03-22 | End: 2023-03-27

## 2023-03-21 RX ORDER — CHLORHEXIDINE GLUCONATE 213 G/1000ML
15 SOLUTION TOPICAL EVERY 12 HOURS
Refills: 0 | Status: DISCONTINUED | OUTPATIENT
Start: 2023-03-21 | End: 2023-03-21

## 2023-03-21 RX ORDER — MUPIROCIN 20 MG/G
1 OINTMENT TOPICAL
Refills: 0 | Status: DISCONTINUED | OUTPATIENT
Start: 2023-03-21 | End: 2023-03-21

## 2023-03-21 RX ORDER — DEXTROSE 50 % IN WATER 50 %
25 SYRINGE (ML) INTRAVENOUS
Refills: 0 | Status: DISCONTINUED | OUTPATIENT
Start: 2023-03-21 | End: 2023-03-24

## 2023-03-21 RX ORDER — SODIUM CHLORIDE 9 MG/ML
500 INJECTION, SOLUTION INTRAVENOUS ONCE
Refills: 0 | Status: COMPLETED | OUTPATIENT
Start: 2023-03-21 | End: 2023-03-21

## 2023-03-21 RX ORDER — PROPOFOL 10 MG/ML
20 INJECTION, EMULSION INTRAVENOUS
Qty: 1000 | Refills: 0 | Status: DISCONTINUED | OUTPATIENT
Start: 2023-03-21 | End: 2023-03-22

## 2023-03-21 RX ORDER — CHLORHEXIDINE GLUCONATE 213 G/1000ML
5 SOLUTION TOPICAL
Refills: 0 | Status: DISCONTINUED | OUTPATIENT
Start: 2023-03-21 | End: 2023-03-21

## 2023-03-21 RX ORDER — NOREPINEPHRINE BITARTRATE/D5W 8 MG/250ML
0.05 PLASTIC BAG, INJECTION (ML) INTRAVENOUS
Qty: 8 | Refills: 0 | Status: DISCONTINUED | OUTPATIENT
Start: 2023-03-21 | End: 2023-03-22

## 2023-03-21 RX ORDER — SODIUM CHLORIDE 9 MG/ML
1000 INJECTION, SOLUTION INTRAVENOUS
Refills: 0 | Status: DISCONTINUED | OUTPATIENT
Start: 2023-03-21 | End: 2023-03-24

## 2023-03-21 RX ORDER — POTASSIUM CHLORIDE 20 MEQ
10 PACKET (EA) ORAL
Refills: 0 | Status: DISCONTINUED | OUTPATIENT
Start: 2023-03-21 | End: 2023-03-22

## 2023-03-21 RX ORDER — CEFUROXIME AXETIL 250 MG
1500 TABLET ORAL EVERY 8 HOURS
Refills: 0 | Status: COMPLETED | OUTPATIENT
Start: 2023-03-21 | End: 2023-03-23

## 2023-03-21 RX ORDER — INSULIN LISPRO 100/ML
2 VIAL (ML) SUBCUTANEOUS
Refills: 0 | Status: DISCONTINUED | OUTPATIENT
Start: 2023-03-21 | End: 2023-03-22

## 2023-03-21 RX ORDER — SODIUM CHLORIDE 9 MG/ML
1000 INJECTION INTRAMUSCULAR; INTRAVENOUS; SUBCUTANEOUS
Refills: 0 | Status: DISCONTINUED | OUTPATIENT
Start: 2023-03-21 | End: 2023-03-23

## 2023-03-21 RX ORDER — PANTOPRAZOLE SODIUM 20 MG/1
40 TABLET, DELAYED RELEASE ORAL ONCE
Refills: 0 | Status: COMPLETED | OUTPATIENT
Start: 2023-03-21 | End: 2023-03-21

## 2023-03-21 RX ORDER — POLYETHYLENE GLYCOL 3350 17 G/17G
17 POWDER, FOR SOLUTION ORAL DAILY
Refills: 0 | Status: DISCONTINUED | OUTPATIENT
Start: 2023-03-22 | End: 2023-03-27

## 2023-03-21 RX ORDER — ATORVASTATIN CALCIUM 80 MG/1
40 TABLET, FILM COATED ORAL AT BEDTIME
Refills: 0 | Status: DISCONTINUED | OUTPATIENT
Start: 2023-03-22 | End: 2023-03-27

## 2023-03-21 RX ORDER — FENTANYL CITRATE 50 UG/ML
50 INJECTION INTRAVENOUS
Refills: 0 | Status: DISCONTINUED | OUTPATIENT
Start: 2023-03-21 | End: 2023-03-22

## 2023-03-21 RX ORDER — ENOXAPARIN SODIUM 100 MG/ML
40 INJECTION SUBCUTANEOUS EVERY 24 HOURS
Refills: 0 | Status: DISCONTINUED | OUTPATIENT
Start: 2023-03-22 | End: 2023-03-27

## 2023-03-21 RX ORDER — ALBUMIN HUMAN 25 %
250 VIAL (ML) INTRAVENOUS ONCE
Refills: 0 | Status: COMPLETED | OUTPATIENT
Start: 2023-03-21 | End: 2023-03-22

## 2023-03-21 RX ORDER — SENNA PLUS 8.6 MG/1
2 TABLET ORAL AT BEDTIME
Refills: 0 | Status: DISCONTINUED | OUTPATIENT
Start: 2023-03-21 | End: 2023-03-27

## 2023-03-21 RX ORDER — INSULIN HUMAN 100 [IU]/ML
2 INJECTION, SOLUTION SUBCUTANEOUS
Qty: 100 | Refills: 0 | Status: DISCONTINUED | OUTPATIENT
Start: 2023-03-21 | End: 2023-03-23

## 2023-03-21 RX ORDER — POTASSIUM CHLORIDE 20 MEQ
10 PACKET (EA) ORAL
Refills: 0 | Status: COMPLETED | OUTPATIENT
Start: 2023-03-21 | End: 2023-03-22

## 2023-03-21 RX ORDER — DEXTROSE 50 % IN WATER 50 %
15 SYRINGE (ML) INTRAVENOUS ONCE
Refills: 0 | Status: DISCONTINUED | OUTPATIENT
Start: 2023-03-21 | End: 2023-03-24

## 2023-03-21 RX ORDER — PANTOPRAZOLE SODIUM 20 MG/1
40 TABLET, DELAYED RELEASE ORAL DAILY
Refills: 0 | Status: DISCONTINUED | OUTPATIENT
Start: 2023-03-22 | End: 2023-03-27

## 2023-03-21 RX ORDER — VANCOMYCIN HCL 1 G
1000 VIAL (EA) INTRAVENOUS EVERY 12 HOURS
Refills: 0 | Status: DISCONTINUED | OUTPATIENT
Start: 2023-03-21 | End: 2023-03-22

## 2023-03-21 RX ADMIN — Medication 100 MILLIEQUIVALENT(S): at 18:13

## 2023-03-21 RX ADMIN — CHLORHEXIDINE GLUCONATE 5 MILLILITER(S): 213 SOLUTION TOPICAL at 18:15

## 2023-03-21 RX ADMIN — Medication 400 MILLIGRAM(S): at 18:30

## 2023-03-21 RX ADMIN — MUPIROCIN 1 APPLICATION(S): 20 OINTMENT TOPICAL at 05:15

## 2023-03-21 RX ADMIN — SODIUM CHLORIDE 2000 MILLILITER(S): 9 INJECTION, SOLUTION INTRAVENOUS at 20:00

## 2023-03-21 RX ADMIN — FENTANYL CITRATE 50 MICROGRAM(S): 50 INJECTION INTRAVENOUS at 15:00

## 2023-03-21 RX ADMIN — Medication 12.5 MILLIGRAM(S): at 05:14

## 2023-03-21 RX ADMIN — Medication 100 MILLIGRAM(S): at 16:35

## 2023-03-21 RX ADMIN — CHLORHEXIDINE GLUCONATE 15 MILLILITER(S): 213 SOLUTION TOPICAL at 05:15

## 2023-03-21 RX ADMIN — Medication 6.68 MICROGRAM(S)/KG/MIN: at 15:09

## 2023-03-21 RX ADMIN — INSULIN HUMAN 2 UNIT(S)/HR: 100 INJECTION, SOLUTION SUBCUTANEOUS at 15:09

## 2023-03-21 RX ADMIN — NICARDIPINE HYDROCHLORIDE 25 MG/HR: 30 CAPSULE, EXTENDED RELEASE ORAL at 15:10

## 2023-03-21 RX ADMIN — Medication 250 MILLIGRAM(S): at 19:55

## 2023-03-21 RX ADMIN — PANTOPRAZOLE SODIUM 40 MILLIGRAM(S): 20 TABLET, DELAYED RELEASE ORAL at 05:14

## 2023-03-21 RX ADMIN — Medication 100 MILLIGRAM(S): at 23:43

## 2023-03-21 RX ADMIN — PANTOPRAZOLE SODIUM 40 MILLIGRAM(S): 20 TABLET, DELAYED RELEASE ORAL at 14:09

## 2023-03-21 RX ADMIN — Medication 81 MILLIGRAM(S): at 20:35

## 2023-03-21 RX ADMIN — Medication 1 APPLICATION(S): at 07:24

## 2023-03-21 RX ADMIN — SODIUM CHLORIDE 3 MILLILITER(S): 9 INJECTION INTRAMUSCULAR; INTRAVENOUS; SUBCUTANEOUS at 06:46

## 2023-03-21 RX ADMIN — Medication 100 MILLIEQUIVALENT(S): at 13:52

## 2023-03-21 RX ADMIN — FENTANYL CITRATE 50 MICROGRAM(S): 50 INJECTION INTRAVENOUS at 15:15

## 2023-03-21 RX ADMIN — Medication 100 MILLIEQUIVALENT(S): at 14:10

## 2023-03-21 RX ADMIN — Medication 100 MILLIEQUIVALENT(S): at 16:35

## 2023-03-21 RX ADMIN — CHLORHEXIDINE GLUCONATE 15 MILLILITER(S): 213 SOLUTION TOPICAL at 18:15

## 2023-03-21 RX ADMIN — Medication 1000 MILLIGRAM(S): at 18:45

## 2023-03-21 RX ADMIN — Medication 100 MILLIEQUIVALENT(S): at 17:14

## 2023-03-21 RX ADMIN — Medication 100 MILLIEQUIVALENT(S): at 23:43

## 2023-03-21 NOTE — BRIEF OPERATIVE NOTE - COMMENTS
patient tx to CTICU   Gtts: levophed, insulin   Blood products given intraop: none   Invasive lines placed: R Radial, RIJ swan   Indwelling catheters: henley   Pacing wires: [id number]   Chest tubes: left 2 meds    No qualified resident was available to assist in this case. I have personally first assisted the Cardiothoracic Surgeon listed in this brief op note throughout the entirety of this case.

## 2023-03-21 NOTE — AIRWAY REMOVAL NOTE  ADULT & PEDS - ARTIFICAL AIRWAY REMOVAL COMMENTS
Pt completed SBT. PSV abg WNL. Pt awake and following commands. Extubated to 40% CAM with PA and RN's at bedside. Pt with no distress at this time.

## 2023-03-21 NOTE — ASU PREOP CHECKLIST - PATIENT SENT TO
[address] [facility] 
[phone] Patient: Sarah Gastelum MRN: [id number] NIXON:0[birth year] You are allergic to the following No active allergies Recent Documentation Height Weight BMI OB Status Smoking Status 1.575 m 56.2 kg 22.68 kg/m2 Hysterectomy Former Smoker Emergency Contacts Name Discharge Info Relation Home Work Mobile Hector Miguel CAREGIVER [3] Spouse [3] [phone] About your hospitalization You were admitted on:  May 1, 2017 You last received care in the:  \[facility] [place] [facility]\"" ENDOSCOPY You were discharged on:  May 1, 2017 Unit phone number:  [phone] Why you were hospitalized Your primary diagnosis was:  Not on File Providers Seen During Your Hospitalizations Provider Role Specialty Primary office phone Rubén Downey MD Attending Provider Gastroenterology [phone] Your Primary Care Physician (PCP) Primary Care Physician Office Phone Office Fax [address] [place] [address], [address] [place] [address] [phone] Follow-up Information Follow up With Details Comments Contact Info Lois Galarza MD   [address] [facility] 
[phone] Current Discharge Medication List  
  
CONTINUE these medications which have NOT CHANGED Dose & Instructions Dispensing Information Comments Morning Noon Evening Bedtime  
 aspirin 81 mg chewable tablet Your last dose was: Your next dose is:    
   
   
 Dose:  81 mg Take 81 mg by mouth daily. Refills:  0  
     
   
   
   
  
 atorvastatin 20 mg tablet Commonly known as:  LIPITOR Your last dose was: Your next dose is:    
   
   
 Dose:  20 mg Take 20 mg by mouth nightly. Refills:  0  
     
   
   
   
  
 HAIR,SKIN AND NAILS PO Your last dose was: Your next dose is: Take  by mouth two (2) times a day. Refills:  0  
     
   
   
   
  
 levothyroxine 75 mcg tablet Commonly known as:  SYNTHROID Your last dose was: Your next dose is:    
   
   
 Dose:  75 mcg Take 75 mcg by mouth Daily (before breakfast). Refills:  0 NORVASC 10 mg tablet Generic drug:  amLODIPine Your last dose was: Your next dose is:    
   
   
 Dose:  10 mg Take 10 mg by mouth every morning. Refills:  0 PREMARIN 0.625 mg/gram vaginal cream  
Generic drug:  conjugated estrogens Your last dose was: Your next dose is:    
   
   
 Dose:  0.5 g Insert 0.5 g into vagina daily. Refills:  0  
     
   
   
   
  
 * sucralfate 100 mg/mL suspension Commonly known as:  [place] Ke Your last dose was: Your next dose is:    
   
   
 Dose:  1 g Take 10 mL by mouth four (4) times daily. Quantity:  1000 mL Refills:  3  
     
   
   
   
  
 * sucralfate 100 mg/mL suspension Commonly known as:  [place] Ke Your last dose was: Your next dose is:    
   
   
 Dose:  1 g Take 10 mL by mouth four (4) times daily. Quantity:  1000 mL Refills:  3 SYSTANE (PROPYLENE GLYCOL) 0.4-0.3 % Drop Generic drug:  peg 400-propylene glycol Your last dose was: Your next dose is:    
   
   
 Administer  to left eye as needed. Refills:  0  
     
   
   
   
  
 temazepam 15 mg capsule Commonly known as:  RESTORIL Your last dose was: Your next dose is: Take  by mouth nightly as needed for Sleep. Refills:  0  
     
   
   
   
  
 TYLENOL EXTRA STRENGTH 500 mg tablet Generic drug:  acetaminophen Your last dose was: Your next dose is: Take  by mouth as needed for Pain. Refills:  0  
     
   
   
   
  
 * Notice:   This list has 2 medication(s) that are the same as other medications prescribed for you. Read the directions carefully, and ask your doctor or other care provider to review them with you. Discharge Instructions Renae Alan [id number] EGD DISCHARGE INSTRUCTIONS Discomfort: 
Sore throat- throat lozenges or warm salt water gargle 
redness at IV site- apply warm compress to area; if redness or soreness persist- contact your physician Gaseous discomfort- walking, belching will help relieve any discomfort You may not operate a vehicle for 12 hours You may not engage in an occupation involving machinery or appliances for rest of today You may not drink alcoholic beverages for at least 12 hours Avoid making any critical decisions for at least 24 hour DIET You may have minimal sips at this time-- do not eat or drink for two hours. You may eat and drink after you wake up You may resume your regular diet  however -  remember your colon is empty and a heavy meal will produce gas. Avoid these foods:  vegetables, fried / greasy foods, carbonated drinks MEDICATIONS: 
See attached ACTIVITY You may resume your normal daily activities until tomorrow AM; 
Spend the remainder of the day resting -  avoid any strenuous activity. CALL M.D. ANY SIGN OF Increasing pain, nausea, vomiting Abdominal distension (swelling) New increased bleeding (oral or rectal) Fever (chills) Pain in chest area Bloody discharge from nose or mouth Shortness of breath You should NOT take any Advil, Aspirin, Ibuprofen, Motrin, Aleve, or Goodys for 10 days, ONLY  Tylenol as needed for pain. IMPRESSION: 
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 ! 
-- we stretched a full cm further today, so it should be even better. It was tight on the last stretching, but should make a big difference 
-- I think we can give it a rest now for awhile, and you can see me in the office in 2-3 months and no more scheduled dilations for now! Follow-up Instructions: 
 Call Dr. Akhil Simmons for questions/follow up Telephone # [phone] Appointment in 2-3 months Mayela Patel MD 
 
  
 
Discharge Orders None Introducing [facility] SERVICES! Sumit Boggs introduces [employer] patient portal. Now you can access parts of your medical record, email your doctor's office, and request medication refills online. 1. In your internet browser, go to [email or web address] 2. Click on the First Time User? Click Here link in the Sign In box. You will see the New Member Sign Up page. 3. Enter your [employer] Access Code exactly as it appears below. You will not need to use this code after youve completed the sign-up process. If you do not sign up before the expiration date, you must request a new code. · [employer] Access Code: [email or web address] Expires: 7/11/2017  9:43 AM 
 
4. Enter the last four digits of your Social Security Number (xxxx) and Date of Birth (mm/dd/yyyy) as indicated and click Submit. You will be taken to the next sign-up page. 5. Create a [employer] ID. This will be your [employer] login ID and cannot be changed, so think of one that is secure and easy to remember. 6. Create a [employer] password. You can change your password at any time. 7. Enter your Password Reset Question and Answer. This can be used at a later time if you forget your password. 8. Enter your e-mail address. You will receive e-mail notification when new information is available in [address] 9. Click Sign Up. You can now view and download portions of your medical record. 10. Click the Download Summary menu link to download a portable copy of your medical information. If you have questions, please visit the Frequently Asked Questions section of the [employer] website. Remember, [employer] is NOT to be used for urgent needs. For medical emergencies, dial 911. Now available from your iPhone and Android! General Information Please provide this summary of care documentation to your next provider. Patient Signature:  ____________________________________________________________ Date:  ____________________________________________________________  
  
Socorro Chard Provider Signature:  ____________________________________________________________ Date:  ____________________________________________________________ operating room

## 2023-03-21 NOTE — BRIEF OPERATIVE NOTE - NSICDXBRIEFPROCEDURE_GEN_ALL_CORE_FT
PROCEDURES:  CABG, with MARIA VICTORIA 21-Mar-2023 13:44:42 AMARIS-LAD , SVG-OM, SVG- PDA   endoscopic vein harvest of bilateral greater sapnehous vein Ale Hernandez

## 2023-03-22 LAB
ANION GAP SERPL CALC-SCNC: 11 MMOL/L — SIGNIFICANT CHANGE UP (ref 5–17)
BUN SERPL-MCNC: 13.3 MG/DL — SIGNIFICANT CHANGE UP (ref 8–20)
CALCIUM SERPL-MCNC: 8 MG/DL — LOW (ref 8.4–10.5)
CHLORIDE SERPL-SCNC: 111 MMOL/L — HIGH (ref 96–108)
CK MB CFR SERPL CALC: 8.9 NG/ML — HIGH (ref 0–6.7)
CK SERPL-CCNC: 391 U/L — HIGH (ref 30–200)
CO2 SERPL-SCNC: 21 MMOL/L — LOW (ref 22–29)
CREAT SERPL-MCNC: 1.21 MG/DL — SIGNIFICANT CHANGE UP (ref 0.5–1.3)
EGFR: 61 ML/MIN/1.73M2 — SIGNIFICANT CHANGE UP
GAS PNL BLDA: SIGNIFICANT CHANGE UP
GLUCOSE BLDC GLUCOMTR-MCNC: 102 MG/DL — HIGH (ref 70–99)
GLUCOSE BLDC GLUCOMTR-MCNC: 108 MG/DL — HIGH (ref 70–99)
GLUCOSE BLDC GLUCOMTR-MCNC: 117 MG/DL — HIGH (ref 70–99)
GLUCOSE BLDC GLUCOMTR-MCNC: 130 MG/DL — HIGH (ref 70–99)
GLUCOSE BLDC GLUCOMTR-MCNC: 132 MG/DL — HIGH (ref 70–99)
GLUCOSE BLDC GLUCOMTR-MCNC: 134 MG/DL — HIGH (ref 70–99)
GLUCOSE BLDC GLUCOMTR-MCNC: 137 MG/DL — HIGH (ref 70–99)
GLUCOSE BLDC GLUCOMTR-MCNC: 142 MG/DL — HIGH (ref 70–99)
GLUCOSE BLDC GLUCOMTR-MCNC: 143 MG/DL — HIGH (ref 70–99)
GLUCOSE BLDC GLUCOMTR-MCNC: 154 MG/DL — HIGH (ref 70–99)
GLUCOSE BLDC GLUCOMTR-MCNC: 162 MG/DL — HIGH (ref 70–99)
GLUCOSE BLDC GLUCOMTR-MCNC: 178 MG/DL — HIGH (ref 70–99)
GLUCOSE BLDC GLUCOMTR-MCNC: 197 MG/DL — HIGH (ref 70–99)
GLUCOSE BLDC GLUCOMTR-MCNC: 210 MG/DL — HIGH (ref 70–99)
GLUCOSE BLDC GLUCOMTR-MCNC: 216 MG/DL — HIGH (ref 70–99)
GLUCOSE BLDC GLUCOMTR-MCNC: 98 MG/DL — SIGNIFICANT CHANGE UP (ref 70–99)
GLUCOSE BLDC GLUCOMTR-MCNC: 99 MG/DL — SIGNIFICANT CHANGE UP (ref 70–99)
GLUCOSE SERPL-MCNC: 98 MG/DL — SIGNIFICANT CHANGE UP (ref 70–99)
HCT VFR BLD CALC: 26.3 % — LOW (ref 39–50)
HGB BLD-MCNC: 9.1 G/DL — LOW (ref 13–17)
MAGNESIUM SERPL-MCNC: 1.9 MG/DL — SIGNIFICANT CHANGE UP (ref 1.6–2.6)
MCHC RBC-ENTMCNC: 31.5 PG — SIGNIFICANT CHANGE UP (ref 27–34)
MCHC RBC-ENTMCNC: 34.6 GM/DL — SIGNIFICANT CHANGE UP (ref 32–36)
MCV RBC AUTO: 91 FL — SIGNIFICANT CHANGE UP (ref 80–100)
PLATELET # BLD AUTO: 180 K/UL — SIGNIFICANT CHANGE UP (ref 150–400)
POTASSIUM SERPL-MCNC: 4.6 MMOL/L — SIGNIFICANT CHANGE UP (ref 3.5–5.3)
POTASSIUM SERPL-SCNC: 4.6 MMOL/L — SIGNIFICANT CHANGE UP (ref 3.5–5.3)
RBC # BLD: 2.89 M/UL — LOW (ref 4.2–5.8)
RBC # FLD: 13.3 % — SIGNIFICANT CHANGE UP (ref 10.3–14.5)
SODIUM SERPL-SCNC: 142 MMOL/L — SIGNIFICANT CHANGE UP (ref 135–145)
TROPONIN T SERPL-MCNC: 0.21 NG/ML — HIGH (ref 0–0.06)
WBC # BLD: 13.78 K/UL — HIGH (ref 3.8–10.5)
WBC # FLD AUTO: 13.78 K/UL — HIGH (ref 3.8–10.5)

## 2023-03-22 PROCEDURE — 71045 X-RAY EXAM CHEST 1 VIEW: CPT | Mod: 26

## 2023-03-22 PROCEDURE — 99291 CRITICAL CARE FIRST HOUR: CPT

## 2023-03-22 PROCEDURE — 99253 IP/OBS CNSLTJ NEW/EST LOW 45: CPT

## 2023-03-22 PROCEDURE — 99024 POSTOP FOLLOW-UP VISIT: CPT

## 2023-03-22 RX ORDER — FUROSEMIDE 40 MG
10 TABLET ORAL DAILY
Refills: 0 | Status: DISCONTINUED | OUTPATIENT
Start: 2023-03-22 | End: 2023-03-23

## 2023-03-22 RX ORDER — OXYCODONE HYDROCHLORIDE 5 MG/1
10 TABLET ORAL EVERY 6 HOURS
Refills: 0 | Status: DISCONTINUED | OUTPATIENT
Start: 2023-03-22 | End: 2023-03-27

## 2023-03-22 RX ORDER — ACETAMINOPHEN 500 MG
650 TABLET ORAL EVERY 6 HOURS
Refills: 0 | Status: DISCONTINUED | OUTPATIENT
Start: 2023-03-22 | End: 2023-03-27

## 2023-03-22 RX ORDER — METOPROLOL TARTRATE 50 MG
25 TABLET ORAL
Refills: 0 | Status: DISCONTINUED | OUTPATIENT
Start: 2023-03-22 | End: 2023-03-23

## 2023-03-22 RX ORDER — OXYCODONE HYDROCHLORIDE 5 MG/1
5 TABLET ORAL EVERY 6 HOURS
Refills: 0 | Status: DISCONTINUED | OUTPATIENT
Start: 2023-03-22 | End: 2023-03-27

## 2023-03-22 RX ORDER — METOCLOPRAMIDE HCL 10 MG
10 TABLET ORAL EVERY 8 HOURS
Refills: 0 | Status: COMPLETED | OUTPATIENT
Start: 2023-03-22 | End: 2023-03-23

## 2023-03-22 RX ORDER — ACETAMINOPHEN 500 MG
1000 TABLET ORAL ONCE
Refills: 0 | Status: COMPLETED | OUTPATIENT
Start: 2023-03-22 | End: 2023-03-22

## 2023-03-22 RX ORDER — INSULIN LISPRO 100/ML
4 VIAL (ML) SUBCUTANEOUS
Refills: 0 | Status: DISCONTINUED | OUTPATIENT
Start: 2023-03-22 | End: 2023-03-24

## 2023-03-22 RX ORDER — VANCOMYCIN HCL 1 G
750 VIAL (EA) INTRAVENOUS
Refills: 0 | Status: COMPLETED | OUTPATIENT
Start: 2023-03-22 | End: 2023-03-23

## 2023-03-22 RX ORDER — ALBUMIN HUMAN 25 %
250 VIAL (ML) INTRAVENOUS ONCE
Refills: 0 | Status: COMPLETED | OUTPATIENT
Start: 2023-03-22 | End: 2023-03-22

## 2023-03-22 RX ORDER — INSULIN LISPRO 100/ML
VIAL (ML) SUBCUTANEOUS
Refills: 0 | Status: DISCONTINUED | OUTPATIENT
Start: 2023-03-22 | End: 2023-03-27

## 2023-03-22 RX ORDER — HYDROMORPHONE HYDROCHLORIDE 2 MG/ML
0.25 INJECTION INTRAMUSCULAR; INTRAVENOUS; SUBCUTANEOUS ONCE
Refills: 0 | Status: DISCONTINUED | OUTPATIENT
Start: 2023-03-22 | End: 2023-03-22

## 2023-03-22 RX ORDER — MAGNESIUM SULFATE 500 MG/ML
2 VIAL (ML) INJECTION ONCE
Refills: 0 | Status: COMPLETED | OUTPATIENT
Start: 2023-03-22 | End: 2023-03-22

## 2023-03-22 RX ORDER — INSULIN GLARGINE 100 [IU]/ML
18 INJECTION, SOLUTION SUBCUTANEOUS AT BEDTIME
Refills: 0 | Status: DISCONTINUED | OUTPATIENT
Start: 2023-03-22 | End: 2023-03-24

## 2023-03-22 RX ADMIN — POLYETHYLENE GLYCOL 3350 17 GRAM(S): 17 POWDER, FOR SOLUTION ORAL at 11:00

## 2023-03-22 RX ADMIN — Medication 81 MILLIGRAM(S): at 11:01

## 2023-03-22 RX ADMIN — Medication 100 MILLIGRAM(S): at 16:00

## 2023-03-22 RX ADMIN — Medication 2: at 21:11

## 2023-03-22 RX ADMIN — CHLORHEXIDINE GLUCONATE 1 APPLICATION(S): 213 SOLUTION TOPICAL at 06:00

## 2023-03-22 RX ADMIN — ATORVASTATIN CALCIUM 40 MILLIGRAM(S): 80 TABLET, FILM COATED ORAL at 21:11

## 2023-03-22 RX ADMIN — SENNA PLUS 2 TABLET(S): 8.6 TABLET ORAL at 21:11

## 2023-03-22 RX ADMIN — Medication 25 MILLIGRAM(S): at 17:06

## 2023-03-22 RX ADMIN — INSULIN GLARGINE 18 UNIT(S): 100 INJECTION, SOLUTION SUBCUTANEOUS at 21:12

## 2023-03-22 RX ADMIN — Medication 25 MILLIGRAM(S): at 06:00

## 2023-03-22 RX ADMIN — Medication 250 MILLIGRAM(S): at 07:54

## 2023-03-22 RX ADMIN — ENOXAPARIN SODIUM 40 MILLIGRAM(S): 100 INJECTION SUBCUTANEOUS at 10:35

## 2023-03-22 RX ADMIN — PANTOPRAZOLE SODIUM 40 MILLIGRAM(S): 20 TABLET, DELAYED RELEASE ORAL at 11:01

## 2023-03-22 RX ADMIN — Medication 1000 MILLIGRAM(S): at 14:00

## 2023-03-22 RX ADMIN — HYDROMORPHONE HYDROCHLORIDE 0.25 MILLIGRAM(S): 2 INJECTION INTRAMUSCULAR; INTRAVENOUS; SUBCUTANEOUS at 10:00

## 2023-03-22 RX ADMIN — Medication 10 MILLIGRAM(S): at 13:08

## 2023-03-22 RX ADMIN — Medication 100 MILLIGRAM(S): at 07:53

## 2023-03-22 RX ADMIN — Medication 100 MILLIEQUIVALENT(S): at 00:26

## 2023-03-22 RX ADMIN — Medication 2 UNIT(S): at 17:06

## 2023-03-22 RX ADMIN — Medication 650 MILLIGRAM(S): at 21:42

## 2023-03-22 RX ADMIN — HYDROMORPHONE HYDROCHLORIDE 0.25 MILLIGRAM(S): 2 INJECTION INTRAMUSCULAR; INTRAVENOUS; SUBCUTANEOUS at 10:15

## 2023-03-22 RX ADMIN — Medication 25 GRAM(S): at 04:14

## 2023-03-22 RX ADMIN — Medication 125 MILLILITER(S): at 09:22

## 2023-03-22 RX ADMIN — Medication 1000 MILLIGRAM(S): at 06:15

## 2023-03-22 RX ADMIN — Medication 400 MILLIGRAM(S): at 13:45

## 2023-03-22 RX ADMIN — Medication 650 MILLIGRAM(S): at 21:12

## 2023-03-22 RX ADMIN — Medication 125 MILLILITER(S): at 00:15

## 2023-03-22 RX ADMIN — TAMSULOSIN HYDROCHLORIDE 0.4 MILLIGRAM(S): 0.4 CAPSULE ORAL at 21:11

## 2023-03-22 RX ADMIN — Medication 250 MILLIGRAM(S): at 19:30

## 2023-03-22 RX ADMIN — Medication 400 MILLIGRAM(S): at 05:56

## 2023-03-22 NOTE — CONSULT NOTE ADULT - SUBJECTIVE AND OBJECTIVE BOX
Patient is a 77y old  Male who presents with a chief complaint of Atherosclerosis of native coronary artery without angina pectoris    HPI:  77M with PMHx of HTN, HLD, BPH, and NIDDM2 who was seen by his Cardiologist outpatient for chest pain work-up. Pt states that he has been experiencing chest pain with minimal exertion rated 1/10, dull, radiating down b/l arms, relieved by rest. Denied any other associated symptoms. Patient states he has otherwise been in good health. Pt is s/p elective LHC/RHC at [facility] 3/20 showing MVD. Pt transferred to [facility] for CABG evaluation. 3/21 s/p 3V CABG          PAST MEDICAL & SURGICAL HISTORY:  HTN (hypertension)    HLD (hyperlipidemia)    BPH without obstruction/lower urinary tract symptoms    DM2 (diabetes mellitus, type 2)    S/P bilateral inguinal hernia repair    S/P appendectomy        Social History:  Smoker: Former cigarette smoker - quit in 1892  Alcohol: Denies  Drug: Denies  Ambulatory Status: No assistive devices, drives vehicle  Upper/Lower Dentures, Glasses  Stairs: Has stairs in home  Occupation: Retired   Living situation: private home  Support System: spouse [x_] children [_x] (20 Mar 2023 18:01)      FAMILY HISTORY:  No pertinent family history          Allergies    No Known Allergies    Intolerances        ROS as noted in the HPI    MEDICATIONS  (STANDING):  acetaminophen   IVPB .. 1000 milliGRAM(s) IV Intermittent once  aspirin enteric coated 81 milliGRAM(s) Oral daily  atorvastatin 40 milliGRAM(s) Oral at bedtime  cefuroxime  IVPB 1500 milliGRAM(s) IV Intermittent every 8 hours  chlorhexidine 2% Cloths 1 Application(s) Topical <User Schedule>  dextrose 5%. 1000 milliLiter(s) (50 mL/Hr) IV Continuous <Continuous>  dextrose 5%. 1000 milliLiter(s) (100 mL/Hr) IV Continuous <Continuous>  dextrose 50% Injectable 50 milliLiter(s) IV Push every 15 minutes  dextrose 50% Injectable 25 milliLiter(s) IV Push every 15 minutes  enoxaparin Injectable 40 milliGRAM(s) SubCutaneous every 24 hours  furosemide   Injectable 10 milliGRAM(s) IV Push daily  glucagon  Injectable 1 milliGRAM(s) IntraMuscular once  insulin lispro Injectable (ADMELOG) 2 Unit(s) SubCutaneous three times a day before meals  insulin regular Infusion 2 Unit(s)/Hr (2 mL/Hr) IV Continuous <Continuous>  metoprolol tartrate 25 milliGRAM(s) Oral two times a day  pantoprazole    Tablet 40 milliGRAM(s) Oral daily  polyethylene glycol 3350 17 Gram(s) Oral daily  senna 2 Tablet(s) Oral at bedtime  sodium chloride 0.9%. 1000 milliLiter(s) (10 mL/Hr) IV Continuous <Continuous>  tamsulosin 0.4 milliGRAM(s) Oral at bedtime  vancomycin  IVPB 750 milliGRAM(s) IV Intermittent <User Schedule>    MEDICATIONS  (PRN):  acetaminophen     Tablet .. 650 milliGRAM(s) Oral every 6 hours PRN Mild Pain (1 - 3)  dextrose Oral Gel 15 Gram(s) Oral once PRN Blood Glucose LESS THAN 70 milliGRAM(s)/deciliter  oxyCODONE    IR 5 milliGRAM(s) Oral every 6 hours PRN Moderate Pain (4 - 6)  oxyCODONE    IR 10 milliGRAM(s) Oral every 6 hours PRN Severe Pain (7 - 10)      Vital Signs Last 24 Hrs  T(C): 37.3 (22 Mar 2023 12:00), Max: 37.4 (22 Mar 2023 07:00)  T(F): 99.1 (22 Mar 2023 12:00), Max: 99.3 (22 Mar 2023 07:00)  HR: 98 (22 Mar 2023 13:00) (73 - 98)  BP: 95/63 (22 Mar 2023 13:00) (95/63 - 162/72)  BP(mean): 75 (22 Mar 2023 13:00) (75 - 104)  RR: 35 (22 Mar 2023 13:00) (10 - 35)  SpO2: 91% (22 Mar 2023 13:00) (91% - 100%)    Parameters below as of 22 Mar 2023 12:00  Patient On (Oxygen Delivery Method): nasal cannula  O2 Flow (L/min): 2        Physical Exam:    Constitutional: NAD, elderly  Neck: trachea midline, no thyroid enlargement  Respiratory: CTAB, normal respirations  Cardiovascular: S1 and S2, RRR, bandaged anterior chest  Gastrointestinal: BS+, soft, ntnd  Extremities: + peripheral edema  Neurological: AOx3, no focal deficits  Psychiatric: Normal mood and normal affect  Skin: no rashes, no acanthosis    LABS  03-22    142  |  111<H>  |  13.3  ----------------------------<  98  4.6   |  21.0<L>  |  1.21    Ca    8.0<L>      22 Mar 2023 02:10  Mg     1.9     03-22    TPro  5.9<L>  /  Alb  3.5  /  TBili  1.1  /  DBili  x   /  AST  25  /  ALT  12  /  AlkPhos  43  03-21                          9.1    13.78 )-----------( 180      ( 22 Mar 2023 02:10 )             26.3       A1C with Estimated Average Glucose Result: 6.9 % (03-20-23 @ 19:10)        Ketone - Urine: Negative (03-21 @ 04:02)    Alkaline Phosphatase, Serum: 43 U/L (03-21-23 @ 13:15)  Albumin, Serum: 3.5 g/dL (03-21-23 @ 13:15)  Aspartate Aminotransferase (AST/SGOT): 25 U/L (03-21-23 @ 13:15)  Alanine Aminotransferase (ALT/SGPT): 12 U/L (03-21-23 @ 13:15)  Alkaline Phosphatase, Serum: 60 U/L (03-21-23 @ 05:11)  Albumin, Serum: 3.8 g/dL (03-21-23 @ 05:11)  Aspartate Aminotransferase (AST/SGOT): 18 U/L (03-21-23 @ 05:11)  Alanine Aminotransferase (ALT/SGPT): 15 U/L (03-21-23 @ 05:11)  Alkaline Phosphatase, Serum: 58 U/L (03-20-23 @ 19:10)  Albumin, Serum: 3.6 g/dL (03-20-23 @ 19:10)  Aspartate Aminotransferase (AST/SGOT): 19 U/L (03-20-23 @ 19:10)  Alanine Aminotransferase (ALT/SGPT): 15 U/L (03-20-23 @ 19:10)    Thyroid Stimulating Hormone, Serum: 0.90 uIU/mL (03-20-23 @ 19:10)  Free Thyroxine, Serum: 1.0 ng/dL (03-20-23 @ 19:10)        CAPILLARY BLOOD GLUCOSE      POCT Blood Glucose.: 130 mg/dL (22 Mar 2023 13:13)  POCT Blood Glucose.: 134 mg/dL (22 Mar 2023 10:56)  POCT Blood Glucose.: 143 mg/dL (22 Mar 2023 09:59)  POCT Blood Glucose.: 143 mg/dL (22 Mar 2023 08:57)  POCT Blood Glucose.: 154 mg/dL (22 Mar 2023 07:59)  POCT Blood Glucose.: 162 mg/dL (22 Mar 2023 07:05)  POCT Blood Glucose.: 143 mg/dL (22 Mar 2023 05:05)  POCT Blood Glucose.: 137 mg/dL (22 Mar 2023 04:05)  POCT Blood Glucose.: 132 mg/dL (22 Mar 2023 02:56)  POCT Blood Glucose.: 99 mg/dL (22 Mar 2023 02:06)  POCT Blood Glucose.: 98 mg/dL (22 Mar 2023 01:01)  POCT Blood Glucose.: 102 mg/dL (22 Mar 2023 00:03)  POCT Blood Glucose.: 106 mg/dL (21 Mar 2023 23:06)  POCT Blood Glucose.: 114 mg/dL (21 Mar 2023 22:03)  POCT Blood Glucose.: 105 mg/dL (21 Mar 2023 21:08)  POCT Blood Glucose.: 148 mg/dL (21 Mar 2023 18:36)  POCT Blood Glucose.: 152 mg/dL (21 Mar 2023 17:54)  POCT Blood Glucose.: 155 mg/dL (21 Mar 2023 16:57)  POCT Blood Glucose.: 174 mg/dL (21 Mar 2023 15:59)  POCT Blood Glucose.: 192 mg/dL (21 Mar 2023 14:58)      Imaging     Patient is a 77y old  Male who presents with a chief complaint of Atherosclerosis of native coronary artery without angina pectoris    HPI:  77M with PMHx of HTN, HLD, BPH, and NIDDM2 who was seen by his Cardiologist outpatient for chest pain work-up. Pt states that he has been experiencing chest pain with minimal exertion rated 1/10, dull, radiating down b/l arms, relieved by rest. Denied any other associated symptoms. Patient states he has otherwise been in good health. Pt is s/p elective LHC/RHC at [facility] 3/20 showing MVD. Pt transferred to [facility] for CABG evaluation. 3/21 s/p 3V CABG    not eating yet, waiting for speech eval  diabetes for >40 years  was taking multiple oral meds at home- metformin, januvia, actos?, glipizide  never on insulin  possibly diabetes in mom  checks FS occ, usually 140s  managed by pmd  lives with wife and daughter  no smoking/etoh    PAST MEDICAL & SURGICAL HISTORY:  HTN (hypertension)    HLD (hyperlipidemia)    BPH without obstruction/lower urinary tract symptoms    DM2 (diabetes mellitus, type 2)    S/P bilateral inguinal hernia repair    S/P appendectomy        Social History:  Smoker: Former cigarette smoker - quit in 1892  Alcohol: Denies  Drug: Denies  Ambulatory Status: No assistive devices, drives vehicle  Upper/Lower Dentures, Glasses  Stairs: Has stairs in home  Occupation: Retired   Living situation: private home  Support System: spouse [x_] children [_x] (20 Mar 2023 18:01)      FAMILY HISTORY:  No pertinent family history          Allergies    No Known Allergies    Intolerances        ROS as noted in the HPI    MEDICATIONS  (STANDING):  acetaminophen   IVPB .. 1000 milliGRAM(s) IV Intermittent once  aspirin enteric coated 81 milliGRAM(s) Oral daily  atorvastatin 40 milliGRAM(s) Oral at bedtime  cefuroxime  IVPB 1500 milliGRAM(s) IV Intermittent every 8 hours  chlorhexidine 2% Cloths 1 Application(s) Topical <User Schedule>  dextrose 5%. 1000 milliLiter(s) (50 mL/Hr) IV Continuous <Continuous>  dextrose 5%. 1000 milliLiter(s) (100 mL/Hr) IV Continuous <Continuous>  dextrose 50% Injectable 50 milliLiter(s) IV Push every 15 minutes  dextrose 50% Injectable 25 milliLiter(s) IV Push every 15 minutes  enoxaparin Injectable 40 milliGRAM(s) SubCutaneous every 24 hours  furosemide   Injectable 10 milliGRAM(s) IV Push daily  glucagon  Injectable 1 milliGRAM(s) IntraMuscular once  insulin lispro Injectable (ADMELOG) 2 Unit(s) SubCutaneous three times a day before meals  insulin regular Infusion 2 Unit(s)/Hr (2 mL/Hr) IV Continuous <Continuous>  metoprolol tartrate 25 milliGRAM(s) Oral two times a day  pantoprazole    Tablet 40 milliGRAM(s) Oral daily  polyethylene glycol 3350 17 Gram(s) Oral daily  senna 2 Tablet(s) Oral at bedtime  sodium chloride 0.9%. 1000 milliLiter(s) (10 mL/Hr) IV Continuous <Continuous>  tamsulosin 0.4 milliGRAM(s) Oral at bedtime  vancomycin  IVPB 750 milliGRAM(s) IV Intermittent <User Schedule>    MEDICATIONS  (PRN):  acetaminophen     Tablet .. 650 milliGRAM(s) Oral every 6 hours PRN Mild Pain (1 - 3)  dextrose Oral Gel 15 Gram(s) Oral once PRN Blood Glucose LESS THAN 70 milliGRAM(s)/deciliter  oxyCODONE    IR 5 milliGRAM(s) Oral every 6 hours PRN Moderate Pain (4 - 6)  oxyCODONE    IR 10 milliGRAM(s) Oral every 6 hours PRN Severe Pain (7 - 10)      Vital Signs Last 24 Hrs  T(C): 37.3 (22 Mar 2023 12:00), Max: 37.4 (22 Mar 2023 07:00)  T(F): 99.1 (22 Mar 2023 12:00), Max: 99.3 (22 Mar 2023 07:00)  HR: 98 (22 Mar 2023 13:00) (73 - 98)  BP: 95/63 (22 Mar 2023 13:00) (95/63 - 162/72)  BP(mean): 75 (22 Mar 2023 13:00) (75 - 104)  RR: 35 (22 Mar 2023 13:00) (10 - 35)  SpO2: 91% (22 Mar 2023 13:00) (91% - 100%)    Parameters below as of 22 Mar 2023 12:00  Patient On (Oxygen Delivery Method): nasal cannula  O2 Flow (L/min): 2        Physical Exam:    Constitutional: NAD, elderly  Neck: trachea midline, no thyroid enlargement  Respiratory: CTAB, normal respirations  Cardiovascular: S1 and S2, RRR, bandaged anterior chest  Gastrointestinal: BS+, soft, ntnd  Extremities: + peripheral edema  Neurological: AOx3, no focal deficits  Psychiatric: Normal mood and normal affect  Skin: no rashes, no acanthosis    LABS  03-22    142  |  111<H>  |  13.3  ----------------------------<  98  4.6   |  21.0<L>  |  1.21    Ca    8.0<L>      22 Mar 2023 02:10  Mg     1.9     03-22    TPro  5.9<L>  /  Alb  3.5  /  TBili  1.1  /  DBili  x   /  AST  25  /  ALT  12  /  AlkPhos  43  03-21                          9.1    13.78 )-----------( 180      ( 22 Mar 2023 02:10 )             26.3       A1C with Estimated Average Glucose Result: 6.9 % (03-20-23 @ 19:10)        Ketone - Urine: Negative (03-21 @ 04:02)    Alkaline Phosphatase, Serum: 43 U/L (03-21-23 @ 13:15)  Albumin, Serum: 3.5 g/dL (03-21-23 @ 13:15)  Aspartate Aminotransferase (AST/SGOT): 25 U/L (03-21-23 @ 13:15)  Alanine Aminotransferase (ALT/SGPT): 12 U/L (03-21-23 @ 13:15)  Alkaline Phosphatase, Serum: 60 U/L (03-21-23 @ 05:11)  Albumin, Serum: 3.8 g/dL (03-21-23 @ 05:11)  Aspartate Aminotransferase (AST/SGOT): 18 U/L (03-21-23 @ 05:11)  Alanine Aminotransferase (ALT/SGPT): 15 U/L (03-21-23 @ 05:11)  Alkaline Phosphatase, Serum: 58 U/L (03-20-23 @ 19:10)  Albumin, Serum: 3.6 g/dL (03-20-23 @ 19:10)  Aspartate Aminotransferase (AST/SGOT): 19 U/L (03-20-23 @ 19:10)  Alanine Aminotransferase (ALT/SGPT): 15 U/L (03-20-23 @ 19:10)    Thyroid Stimulating Hormone, Serum: 0.90 uIU/mL (03-20-23 @ 19:10)  Free Thyroxine, Serum: 1.0 ng/dL (03-20-23 @ 19:10)        CAPILLARY BLOOD GLUCOSE      POCT Blood Glucose.: 130 mg/dL (22 Mar 2023 13:13)  POCT Blood Glucose.: 134 mg/dL (22 Mar 2023 10:56)  POCT Blood Glucose.: 143 mg/dL (22 Mar 2023 09:59)  POCT Blood Glucose.: 143 mg/dL (22 Mar 2023 08:57)  POCT Blood Glucose.: 154 mg/dL (22 Mar 2023 07:59)  POCT Blood Glucose.: 162 mg/dL (22 Mar 2023 07:05)  POCT Blood Glucose.: 143 mg/dL (22 Mar 2023 05:05)  POCT Blood Glucose.: 137 mg/dL (22 Mar 2023 04:05)  POCT Blood Glucose.: 132 mg/dL (22 Mar 2023 02:56)  POCT Blood Glucose.: 99 mg/dL (22 Mar 2023 02:06)  POCT Blood Glucose.: 98 mg/dL (22 Mar 2023 01:01)  POCT Blood Glucose.: 102 mg/dL (22 Mar 2023 00:03)  POCT Blood Glucose.: 106 mg/dL (21 Mar 2023 23:06)  POCT Blood Glucose.: 114 mg/dL (21 Mar 2023 22:03)  POCT Blood Glucose.: 105 mg/dL (21 Mar 2023 21:08)  POCT Blood Glucose.: 148 mg/dL (21 Mar 2023 18:36)  POCT Blood Glucose.: 152 mg/dL (21 Mar 2023 17:54)  POCT Blood Glucose.: 155 mg/dL (21 Mar 2023 16:57)  POCT Blood Glucose.: 174 mg/dL (21 Mar 2023 15:59)  POCT Blood Glucose.: 192 mg/dL (21 Mar 2023 14:58)      Imaging

## 2023-03-22 NOTE — SWALLOW BEDSIDE ASSESSMENT ADULT - PHARYNGEAL PHASE
Within functional limits Wet vocal quality post oral intake/Delayed throat clear post oral intake/Multiple swallows

## 2023-03-22 NOTE — SWALLOW BEDSIDE ASSESSMENT ADULT - SWALLOW EVAL: DIAGNOSIS
Oral phase of swallow judged to be WFL. Suspect pharyngeal dysphagia w/ thin liquids marked by multiple swallows (4-5), wet vocal quality and delayed throat clear. No overt s/s of penetration/aspiration observed w/ solid trials or mildly thick liquids

## 2023-03-22 NOTE — DIETITIAN INITIAL EVALUATION ADULT - ORAL INTAKE PTA/DIET HISTORY
Pt reports eating well PTA; denies any recent weight changes. Pt reports currently having no appetite (post-op),  breakfast tray untouched. Per RN; pt with slight coughing after liquids this morning; RN unsure if 2/2 ill fitting dentures. RN to monitor for s/s of aspiration noted.

## 2023-03-22 NOTE — DIETITIAN INITIAL EVALUATION ADULT - NS FNS DIET ORDER
Diet, Regular:   Consistent Carbohydrate {No Snacks} (CSTCHO)  DASH/TLC {Sodium & Cholesterol Restricted} (DASH) (03-22-23 @ 10:33)  Diet, Clear Liquid:   Consistent Carbohydrate {No Snacks} (CSTCHO)  DASH/TLC {Sodium & Cholesterol Restricted} (DASH) (03-22-23 @ 10:33)

## 2023-03-22 NOTE — CONSULT NOTE ADULT - ASSESSMENT
77M with PMHx of HTN, HLD, BPH, and NIDDM2 who was seen by his Cardiologist outpatient for chest pain work-up. Pt states that he has been experiencing chest pain with minimal exertion rated 1/10, dull, radiating down b/l arms, relieved by rest. Denied any other associated symptoms. Patient states he has otherwise been in good health. Pt is s/p elective LHC/RHC at [facility] 3/20 showing MVD. Pt transferred to [facility] for CABG evaluation. 3/21 s/p 3V CABG    T2DM- FS well controlled on insulin gtt  -A1c 6.9  -check sugars AC and bedtime  -ensure diabetic diet  -transition to basal/bolus, suggest lantus 18 units QHS and admelog 4 units TID  -continue with insulin sliding scale  -new to insulin, needs to be seen by cde    CAD- s/p 3V CABG, care per primary team    HLD- continue statin   77M with PMHx of HTN, HLD, BPH, and NIDDM2 who was seen by his Cardiologist outpatient for chest pain work-up. Pt states that he has been experiencing chest pain with minimal exertion rated 1/10, dull, radiating down b/l arms, relieved by rest. Denied any other associated symptoms. Patient states he has otherwise been in good health. Pt is s/p elective LHC/RHC at [facility] 3/20 showing MVD. Pt transferred to [facility] for CABG evaluation. 3/21 s/p 3V CABG    T2DM- FS well controlled on insulin gtt  -A1c 6.9  -check sugars AC and bedtime  -ensure diabetic diet  -transition to basal/bolus, suggest lantus 18 units QHS and admelog 4 units TID with insulin sliding scale  -would benefit from nutrition consult  -start mealtime insulin when eating  -new to insulin, needs to be seen by cde    CAD- s/p 3V CABG, care per primary team    HLD- continue statin

## 2023-03-22 NOTE — DIETITIAN INITIAL EVALUATION ADULT - OTHER INFO
Pt is a 76yo Male with PMHx of HTN, HLD, BPH, and NIDDM2 who was seen by his Cardiologist outpatient for chest pain work-up. Pt states that he has been experiencing chest pain with minimal exertion rated 1/10, dull, radiating down b/l arms, relieved by rest. Denied any other associated symptoms. Patient states he has otherwise been in good health. Pt is s/p elective LHC/RHC at [facility] 3/20 showing MVD. Pt transferred to [facility] for CABG evaluation. 3/21 underwent CABG x3 with Dr Goel.  He was exubated POD 0.

## 2023-03-22 NOTE — DIETITIAN INITIAL EVALUATION ADULT - NSFNSGIIOFT_GEN_A_CORE
03-21-23 @ 07:01  -  03-22-23 @ 07:00  --------------------------------------------------------  OUT:    Chest Tube (mL): 310 mL    Chest Tube (mL): 260 mL    Chest Tube (mL): 290 mL  Total OUT: 860 mL    Total NET: -860 mL      03-22-23 @ 07:01 - 03-22-23 @ 10:38  --------------------------------------------------------  OUT:    Chest Tube (mL): 110 mL    Chest Tube (mL): 10 mL    Chest Tube (mL): 60 mL  Total OUT: 180 mL    Total NET: -180 mL

## 2023-03-22 NOTE — SWALLOW BEDSIDE ASSESSMENT ADULT - SLP PERTINENT HISTORY OF CURRENT PROBLEM
As per H&P: "Pt is a 78yo Male with PMHx of HTN, HLD, BPH, and NIDDM2 who was seen by his Cardiologist outpatient for chest pain work-up. Pt states that he has been experiencing chest pain with minimal exertion rated 1/10, dull, radiating down b/l arms, relieved by rest. Denied any other associated symptoms. Patient states he has otherwise been in good health. Pt is s/p elective LHC/RHC at [facility] 3/20 showing MVD. Pt transferred to [facility] for CABG evaluation. Pt arrived to [facility] via EMS transport in no apparent distress. Pt currently denies CP, SOB at rest, orthopnea, palpitations, HA/dizziness, numbness/tingling in extremities, abdominal pain, N/V/D/C, or any other acute complaints."

## 2023-03-22 NOTE — DIETITIAN INITIAL EVALUATION ADULT - PERTINENT LABORATORY DATA
03-22    142  |  111<H>  |  13.3  ----------------------------<  98  4.6   |  21.0<L>  |  1.21    Ca    8.0<L>      22 Mar 2023 02:10  Mg     1.9     03-22    TPro  5.9<L>  /  Alb  3.5  /  TBili  1.1  /  DBili  x   /  AST  25  /  ALT  12  /  AlkPhos  43  03-21  POCT Blood Glucose.: 143 mg/dL (03-22-23 @ 09:59)  A1C with Estimated Average Glucose Result: 6.9 % (03-20-23 @ 19:10)

## 2023-03-22 NOTE — DIETITIAN INITIAL EVALUATION ADULT - PERTINENT MEDS FT
MEDICATIONS  (STANDING):  aspirin enteric coated 81 milliGRAM(s) Oral daily  atorvastatin 40 milliGRAM(s) Oral at bedtime  cefuroxime  IVPB 1500 milliGRAM(s) IV Intermittent every 8 hours  chlorhexidine 2% Cloths 1 Application(s) Topical <User Schedule>  dextrose 5%. 1000 milliLiter(s) (50 mL/Hr) IV Continuous <Continuous>  dextrose 5%. 1000 milliLiter(s) (100 mL/Hr) IV Continuous <Continuous>  dextrose 50% Injectable 50 milliLiter(s) IV Push every 15 minutes  dextrose 50% Injectable 25 milliLiter(s) IV Push every 15 minutes  enoxaparin Injectable 40 milliGRAM(s) SubCutaneous every 24 hours  glucagon  Injectable 1 milliGRAM(s) IntraMuscular once  HYDROmorphone  Injectable 0.25 milliGRAM(s) IV Push once  insulin lispro Injectable (ADMELOG) 2 Unit(s) SubCutaneous three times a day before meals  insulin regular Infusion 2 Unit(s)/Hr (2 mL/Hr) IV Continuous <Continuous>  metoprolol tartrate 25 milliGRAM(s) Oral two times a day  pantoprazole    Tablet 40 milliGRAM(s) Oral daily  polyethylene glycol 3350 17 Gram(s) Oral daily  senna 2 Tablet(s) Oral at bedtime  sodium chloride 0.9%. 1000 milliLiter(s) (10 mL/Hr) IV Continuous <Continuous>  tamsulosin 0.4 milliGRAM(s) Oral at bedtime  vancomycin  IVPB 750 milliGRAM(s) IV Intermittent <User Schedule>    MEDICATIONS  (PRN):  acetaminophen     Tablet .. 650 milliGRAM(s) Oral every 6 hours PRN Mild Pain (1 - 3)  dextrose Oral Gel 15 Gram(s) Oral once PRN Blood Glucose LESS THAN 70 milliGRAM(s)/deciliter  oxyCODONE    IR 5 milliGRAM(s) Oral every 6 hours PRN Moderate Pain (4 - 6)  oxyCODONE    IR 10 milliGRAM(s) Oral every 6 hours PRN Severe Pain (7 - 10)

## 2023-03-23 LAB
ALBUMIN SERPL ELPH-MCNC: 3.8 G/DL — SIGNIFICANT CHANGE UP (ref 3.3–5.2)
ALP SERPL-CCNC: 44 U/L — SIGNIFICANT CHANGE UP (ref 40–120)
ALT FLD-CCNC: 12 U/L — SIGNIFICANT CHANGE UP
AMYLASE P1 CFR SERPL: 43 U/L — SIGNIFICANT CHANGE UP (ref 36–128)
ANION GAP SERPL CALC-SCNC: 12 MMOL/L — SIGNIFICANT CHANGE UP (ref 5–17)
ANION GAP SERPL CALC-SCNC: 13 MMOL/L — SIGNIFICANT CHANGE UP (ref 5–17)
AST SERPL-CCNC: 23 U/L — SIGNIFICANT CHANGE UP
BILIRUB DIRECT SERPL-MCNC: 0.2 MG/DL — SIGNIFICANT CHANGE UP (ref 0–0.3)
BILIRUB INDIRECT FLD-MCNC: 0.4 MG/DL — SIGNIFICANT CHANGE UP (ref 0.2–1)
BILIRUB SERPL-MCNC: 0.6 MG/DL — SIGNIFICANT CHANGE UP (ref 0.4–2)
BLD GP AB SCN SERPL QL: SIGNIFICANT CHANGE UP
BUN SERPL-MCNC: 20.7 MG/DL — HIGH (ref 8–20)
BUN SERPL-MCNC: 21.7 MG/DL — HIGH (ref 8–20)
CALCIUM SERPL-MCNC: 7.8 MG/DL — LOW (ref 8.4–10.5)
CALCIUM SERPL-MCNC: 8.4 MG/DL — SIGNIFICANT CHANGE UP (ref 8.4–10.5)
CHLORIDE SERPL-SCNC: 103 MMOL/L — SIGNIFICANT CHANGE UP (ref 96–108)
CHLORIDE SERPL-SCNC: 108 MMOL/L — SIGNIFICANT CHANGE UP (ref 96–108)
CO2 SERPL-SCNC: 20 MMOL/L — LOW (ref 22–29)
CO2 SERPL-SCNC: 23 MMOL/L — SIGNIFICANT CHANGE UP (ref 22–29)
CREAT SERPL-MCNC: 1.19 MG/DL — SIGNIFICANT CHANGE UP (ref 0.5–1.3)
CREAT SERPL-MCNC: 1.29 MG/DL — SIGNIFICANT CHANGE UP (ref 0.5–1.3)
EGFR: 56 ML/MIN/1.73M2 — LOW
EGFR: 62 ML/MIN/1.73M2 — SIGNIFICANT CHANGE UP
GLUCOSE BLDC GLUCOMTR-MCNC: 148 MG/DL — HIGH (ref 70–99)
GLUCOSE BLDC GLUCOMTR-MCNC: 163 MG/DL — HIGH (ref 70–99)
GLUCOSE BLDC GLUCOMTR-MCNC: 163 MG/DL — HIGH (ref 70–99)
GLUCOSE BLDC GLUCOMTR-MCNC: 202 MG/DL — HIGH (ref 70–99)
GLUCOSE SERPL-MCNC: 117 MG/DL — HIGH (ref 70–99)
GLUCOSE SERPL-MCNC: 172 MG/DL — HIGH (ref 70–99)
HCT VFR BLD CALC: 22 % — LOW (ref 39–50)
HCT VFR BLD CALC: 22.3 % — LOW (ref 39–50)
HCT VFR BLD CALC: 29.6 % — LOW (ref 39–50)
HGB BLD-MCNC: 10.4 G/DL — LOW (ref 13–17)
HGB BLD-MCNC: 7.6 G/DL — LOW (ref 13–17)
HGB BLD-MCNC: 7.6 G/DL — LOW (ref 13–17)
LACTATE SERPL-SCNC: 1.4 MMOL/L — SIGNIFICANT CHANGE UP (ref 0.5–2)
LIDOCAIN IGE QN: 22 U/L — SIGNIFICANT CHANGE UP (ref 22–51)
MAGNESIUM SERPL-MCNC: 2.4 MG/DL — SIGNIFICANT CHANGE UP (ref 1.8–2.6)
MCHC RBC-ENTMCNC: 30.6 PG — SIGNIFICANT CHANGE UP (ref 27–34)
MCHC RBC-ENTMCNC: 31 PG — SIGNIFICANT CHANGE UP (ref 27–34)
MCHC RBC-ENTMCNC: 31.3 PG — SIGNIFICANT CHANGE UP (ref 27–34)
MCHC RBC-ENTMCNC: 34.1 GM/DL — SIGNIFICANT CHANGE UP (ref 32–36)
MCHC RBC-ENTMCNC: 34.5 GM/DL — SIGNIFICANT CHANGE UP (ref 32–36)
MCHC RBC-ENTMCNC: 35.1 GM/DL — SIGNIFICANT CHANGE UP (ref 32–36)
MCV RBC AUTO: 87.1 FL — SIGNIFICANT CHANGE UP (ref 80–100)
MCV RBC AUTO: 90.5 FL — SIGNIFICANT CHANGE UP (ref 80–100)
MCV RBC AUTO: 91 FL — SIGNIFICANT CHANGE UP (ref 80–100)
PLATELET # BLD AUTO: 158 K/UL — SIGNIFICANT CHANGE UP (ref 150–400)
PLATELET # BLD AUTO: 163 K/UL — SIGNIFICANT CHANGE UP (ref 150–400)
PLATELET # BLD AUTO: 174 K/UL — SIGNIFICANT CHANGE UP (ref 150–400)
POTASSIUM SERPL-MCNC: 4.1 MMOL/L — SIGNIFICANT CHANGE UP (ref 3.5–5.3)
POTASSIUM SERPL-MCNC: 4.5 MMOL/L — SIGNIFICANT CHANGE UP (ref 3.5–5.3)
POTASSIUM SERPL-SCNC: 4.1 MMOL/L — SIGNIFICANT CHANGE UP (ref 3.5–5.3)
POTASSIUM SERPL-SCNC: 4.5 MMOL/L — SIGNIFICANT CHANGE UP (ref 3.5–5.3)
PROT SERPL-MCNC: 6.5 G/DL — LOW (ref 6.6–8.7)
RBC # BLD: 2.43 M/UL — LOW (ref 4.2–5.8)
RBC # BLD: 2.45 M/UL — LOW (ref 4.2–5.8)
RBC # BLD: 3.4 M/UL — LOW (ref 4.2–5.8)
RBC # FLD: 13.7 % — SIGNIFICANT CHANGE UP (ref 10.3–14.5)
RBC # FLD: 13.7 % — SIGNIFICANT CHANGE UP (ref 10.3–14.5)
RBC # FLD: 14.5 % — SIGNIFICANT CHANGE UP (ref 10.3–14.5)
SODIUM SERPL-SCNC: 138 MMOL/L — SIGNIFICANT CHANGE UP (ref 135–145)
SODIUM SERPL-SCNC: 141 MMOL/L — SIGNIFICANT CHANGE UP (ref 135–145)
WBC # BLD: 11.53 K/UL — HIGH (ref 3.8–10.5)
WBC # BLD: 11.94 K/UL — HIGH (ref 3.8–10.5)
WBC # BLD: 14.53 K/UL — HIGH (ref 3.8–10.5)
WBC # FLD AUTO: 11.53 K/UL — HIGH (ref 3.8–10.5)
WBC # FLD AUTO: 11.94 K/UL — HIGH (ref 3.8–10.5)
WBC # FLD AUTO: 14.53 K/UL — HIGH (ref 3.8–10.5)

## 2023-03-23 PROCEDURE — 99233 SBSQ HOSP IP/OBS HIGH 50: CPT

## 2023-03-23 PROCEDURE — 99024 POSTOP FOLLOW-UP VISIT: CPT

## 2023-03-23 PROCEDURE — 71045 X-RAY EXAM CHEST 1 VIEW: CPT | Mod: 26

## 2023-03-23 PROCEDURE — 74018 RADEX ABDOMEN 1 VIEW: CPT | Mod: 26

## 2023-03-23 PROCEDURE — 99291 CRITICAL CARE FIRST HOUR: CPT

## 2023-03-23 PROCEDURE — 93010 ELECTROCARDIOGRAM REPORT: CPT

## 2023-03-23 PROCEDURE — 74018 RADEX ABDOMEN 1 VIEW: CPT | Mod: 26,77

## 2023-03-23 RX ORDER — METOPROLOL TARTRATE 50 MG
5 TABLET ORAL ONCE
Refills: 0 | Status: COMPLETED | OUTPATIENT
Start: 2023-03-23 | End: 2023-03-23

## 2023-03-23 RX ORDER — ACETAMINOPHEN 500 MG
1000 TABLET ORAL ONCE
Refills: 0 | Status: COMPLETED | OUTPATIENT
Start: 2023-03-23 | End: 2023-03-23

## 2023-03-23 RX ORDER — METOPROLOL TARTRATE 50 MG
50 TABLET ORAL EVERY 8 HOURS
Refills: 0 | Status: DISCONTINUED | OUTPATIENT
Start: 2023-03-24 | End: 2023-03-24

## 2023-03-23 RX ORDER — FUROSEMIDE 40 MG
40 TABLET ORAL ONCE
Refills: 0 | Status: COMPLETED | OUTPATIENT
Start: 2023-03-23 | End: 2023-03-23

## 2023-03-23 RX ORDER — METOPROLOL TARTRATE 50 MG
25 TABLET ORAL ONCE
Refills: 0 | Status: COMPLETED | OUTPATIENT
Start: 2023-03-23 | End: 2023-03-23

## 2023-03-23 RX ORDER — METOPROLOL TARTRATE 50 MG
50 TABLET ORAL EVERY 12 HOURS
Refills: 0 | Status: DISCONTINUED | OUTPATIENT
Start: 2023-03-23 | End: 2023-03-23

## 2023-03-23 RX ADMIN — ENOXAPARIN SODIUM 40 MILLIGRAM(S): 100 INJECTION SUBCUTANEOUS at 13:37

## 2023-03-23 RX ADMIN — Medication 4: at 12:35

## 2023-03-23 RX ADMIN — PANTOPRAZOLE SODIUM 40 MILLIGRAM(S): 20 TABLET, DELAYED RELEASE ORAL at 14:05

## 2023-03-23 RX ADMIN — INSULIN GLARGINE 18 UNIT(S): 100 INJECTION, SOLUTION SUBCUTANEOUS at 21:34

## 2023-03-23 RX ADMIN — ATORVASTATIN CALCIUM 40 MILLIGRAM(S): 80 TABLET, FILM COATED ORAL at 21:29

## 2023-03-23 RX ADMIN — Medication 250 MILLIGRAM(S): at 11:12

## 2023-03-23 RX ADMIN — Medication 400 MILLIGRAM(S): at 14:00

## 2023-03-23 RX ADMIN — Medication 25 MILLIGRAM(S): at 06:12

## 2023-03-23 RX ADMIN — Medication 50 MILLIGRAM(S): at 18:23

## 2023-03-23 RX ADMIN — Medication 5 MILLIGRAM(S): at 20:07

## 2023-03-23 RX ADMIN — Medication 4 UNIT(S): at 08:26

## 2023-03-23 RX ADMIN — Medication 4 UNIT(S): at 12:34

## 2023-03-23 RX ADMIN — Medication 2: at 18:23

## 2023-03-23 RX ADMIN — Medication 100 MILLIGRAM(S): at 00:16

## 2023-03-23 RX ADMIN — Medication 25 MILLIGRAM(S): at 14:05

## 2023-03-23 RX ADMIN — Medication 2: at 08:27

## 2023-03-23 RX ADMIN — SENNA PLUS 2 TABLET(S): 8.6 TABLET ORAL at 21:29

## 2023-03-23 RX ADMIN — Medication 4 UNIT(S): at 18:23

## 2023-03-23 RX ADMIN — OXYCODONE HYDROCHLORIDE 5 MILLIGRAM(S): 5 TABLET ORAL at 14:30

## 2023-03-23 RX ADMIN — Medication 10 MILLIGRAM(S): at 21:29

## 2023-03-23 RX ADMIN — Medication 81 MILLIGRAM(S): at 13:37

## 2023-03-23 RX ADMIN — OXYCODONE HYDROCHLORIDE 5 MILLIGRAM(S): 5 TABLET ORAL at 14:00

## 2023-03-23 RX ADMIN — CHLORHEXIDINE GLUCONATE 1 APPLICATION(S): 213 SOLUTION TOPICAL at 06:12

## 2023-03-23 RX ADMIN — Medication 10 MILLIGRAM(S): at 06:13

## 2023-03-23 RX ADMIN — Medication 10 MILLIGRAM(S): at 13:39

## 2023-03-23 RX ADMIN — TAMSULOSIN HYDROCHLORIDE 0.4 MILLIGRAM(S): 0.4 CAPSULE ORAL at 21:29

## 2023-03-23 RX ADMIN — POLYETHYLENE GLYCOL 3350 17 GRAM(S): 17 POWDER, FOR SOLUTION ORAL at 13:37

## 2023-03-23 RX ADMIN — Medication 40 MILLIGRAM(S): at 07:32

## 2023-03-23 RX ADMIN — Medication 1000 MILLIGRAM(S): at 14:30

## 2023-03-24 LAB
ANION GAP SERPL CALC-SCNC: 12 MMOL/L — SIGNIFICANT CHANGE UP (ref 5–17)
BUN SERPL-MCNC: 19.5 MG/DL — SIGNIFICANT CHANGE UP (ref 8–20)
CALCIUM SERPL-MCNC: 8 MG/DL — LOW (ref 8.4–10.5)
CHLORIDE SERPL-SCNC: 104 MMOL/L — SIGNIFICANT CHANGE UP (ref 96–108)
CO2 SERPL-SCNC: 24 MMOL/L — SIGNIFICANT CHANGE UP (ref 22–29)
CREAT SERPL-MCNC: 0.98 MG/DL — SIGNIFICANT CHANGE UP (ref 0.5–1.3)
EGFR: 78 ML/MIN/1.73M2 — SIGNIFICANT CHANGE UP
GLUCOSE BLDC GLUCOMTR-MCNC: 135 MG/DL — HIGH (ref 70–99)
GLUCOSE BLDC GLUCOMTR-MCNC: 149 MG/DL — HIGH (ref 70–99)
GLUCOSE BLDC GLUCOMTR-MCNC: 156 MG/DL — HIGH (ref 70–99)
GLUCOSE BLDC GLUCOMTR-MCNC: 97 MG/DL — SIGNIFICANT CHANGE UP (ref 70–99)
GLUCOSE BLDC GLUCOMTR-MCNC: 98 MG/DL — SIGNIFICANT CHANGE UP (ref 70–99)
GLUCOSE SERPL-MCNC: 115 MG/DL — HIGH (ref 70–99)
HCT VFR BLD CALC: 30.1 % — LOW (ref 39–50)
HGB BLD-MCNC: 10.4 G/DL — LOW (ref 13–17)
MAGNESIUM SERPL-MCNC: 2.1 MG/DL — SIGNIFICANT CHANGE UP (ref 1.6–2.6)
MCHC RBC-ENTMCNC: 31 PG — SIGNIFICANT CHANGE UP (ref 27–34)
MCHC RBC-ENTMCNC: 34.6 GM/DL — SIGNIFICANT CHANGE UP (ref 32–36)
MCV RBC AUTO: 89.6 FL — SIGNIFICANT CHANGE UP (ref 80–100)
PLATELET # BLD AUTO: 165 K/UL — SIGNIFICANT CHANGE UP (ref 150–400)
POTASSIUM SERPL-MCNC: 4.1 MMOL/L — SIGNIFICANT CHANGE UP (ref 3.5–5.3)
POTASSIUM SERPL-SCNC: 4.1 MMOL/L — SIGNIFICANT CHANGE UP (ref 3.5–5.3)
RBC # BLD: 3.36 M/UL — LOW (ref 4.2–5.8)
RBC # FLD: 14.8 % — HIGH (ref 10.3–14.5)
SODIUM SERPL-SCNC: 140 MMOL/L — SIGNIFICANT CHANGE UP (ref 135–145)
WBC # BLD: 12.68 K/UL — HIGH (ref 3.8–10.5)
WBC # FLD AUTO: 12.68 K/UL — HIGH (ref 3.8–10.5)

## 2023-03-24 PROCEDURE — 71045 X-RAY EXAM CHEST 1 VIEW: CPT | Mod: 26

## 2023-03-24 PROCEDURE — 99232 SBSQ HOSP IP/OBS MODERATE 35: CPT

## 2023-03-24 PROCEDURE — 99024 POSTOP FOLLOW-UP VISIT: CPT

## 2023-03-24 PROCEDURE — 71045 X-RAY EXAM CHEST 1 VIEW: CPT | Mod: 26,77

## 2023-03-24 RX ORDER — METOPROLOL TARTRATE 50 MG
5 TABLET ORAL ONCE
Refills: 0 | Status: COMPLETED | OUTPATIENT
Start: 2023-03-24 | End: 2023-03-24

## 2023-03-24 RX ORDER — ALPRAZOLAM 0.25 MG
0.25 TABLET ORAL ONCE
Refills: 0 | Status: DISCONTINUED | OUTPATIENT
Start: 2023-03-24 | End: 2023-03-24

## 2023-03-24 RX ORDER — METOPROLOL TARTRATE 50 MG
100 TABLET ORAL
Refills: 0 | Status: DISCONTINUED | OUTPATIENT
Start: 2023-03-24 | End: 2023-03-27

## 2023-03-24 RX ORDER — POTASSIUM CHLORIDE 20 MEQ
20 PACKET (EA) ORAL DAILY
Refills: 0 | Status: DISCONTINUED | OUTPATIENT
Start: 2023-03-24 | End: 2023-03-27

## 2023-03-24 RX ORDER — METFORMIN HYDROCHLORIDE 850 MG/1
500 TABLET ORAL
Refills: 0 | Status: DISCONTINUED | OUTPATIENT
Start: 2023-03-24 | End: 2023-03-25

## 2023-03-24 RX ORDER — INSULIN GLARGINE 100 [IU]/ML
10 INJECTION, SOLUTION SUBCUTANEOUS AT BEDTIME
Refills: 0 | Status: DISCONTINUED | OUTPATIENT
Start: 2023-03-24 | End: 2023-03-25

## 2023-03-24 RX ORDER — LINAGLIPTIN 5 MG/1
5 TABLET, FILM COATED ORAL DAILY
Refills: 0 | Status: DISCONTINUED | OUTPATIENT
Start: 2023-03-24 | End: 2023-03-27

## 2023-03-24 RX ORDER — FUROSEMIDE 40 MG
20 TABLET ORAL DAILY
Refills: 0 | Status: DISCONTINUED | OUTPATIENT
Start: 2023-03-24 | End: 2023-03-27

## 2023-03-24 RX ORDER — METOPROLOL TARTRATE 50 MG
50 TABLET ORAL ONCE
Refills: 0 | Status: COMPLETED | OUTPATIENT
Start: 2023-03-24 | End: 2023-03-24

## 2023-03-24 RX ADMIN — Medication 4 UNIT(S): at 12:36

## 2023-03-24 RX ADMIN — INSULIN GLARGINE 10 UNIT(S): 100 INJECTION, SOLUTION SUBCUTANEOUS at 21:10

## 2023-03-24 RX ADMIN — Medication 4 UNIT(S): at 08:19

## 2023-03-24 RX ADMIN — OXYCODONE HYDROCHLORIDE 5 MILLIGRAM(S): 5 TABLET ORAL at 03:14

## 2023-03-24 RX ADMIN — POLYETHYLENE GLYCOL 3350 17 GRAM(S): 17 POWDER, FOR SOLUTION ORAL at 09:13

## 2023-03-24 RX ADMIN — SENNA PLUS 2 TABLET(S): 8.6 TABLET ORAL at 21:18

## 2023-03-24 RX ADMIN — Medication 20 MILLIGRAM(S): at 09:12

## 2023-03-24 RX ADMIN — Medication 5 MILLIGRAM(S): at 00:50

## 2023-03-24 RX ADMIN — OXYCODONE HYDROCHLORIDE 10 MILLIGRAM(S): 5 TABLET ORAL at 10:12

## 2023-03-24 RX ADMIN — ENOXAPARIN SODIUM 40 MILLIGRAM(S): 100 INJECTION SUBCUTANEOUS at 21:12

## 2023-03-24 RX ADMIN — Medication 20 MILLIEQUIVALENT(S): at 12:55

## 2023-03-24 RX ADMIN — Medication 81 MILLIGRAM(S): at 09:12

## 2023-03-24 RX ADMIN — OXYCODONE HYDROCHLORIDE 10 MILLIGRAM(S): 5 TABLET ORAL at 09:12

## 2023-03-24 RX ADMIN — CHLORHEXIDINE GLUCONATE 1 APPLICATION(S): 213 SOLUTION TOPICAL at 05:57

## 2023-03-24 RX ADMIN — Medication 2: at 12:36

## 2023-03-24 RX ADMIN — TAMSULOSIN HYDROCHLORIDE 0.4 MILLIGRAM(S): 0.4 CAPSULE ORAL at 21:13

## 2023-03-24 RX ADMIN — Medication 50 MILLIGRAM(S): at 12:56

## 2023-03-24 RX ADMIN — Medication 50 MILLIGRAM(S): at 02:48

## 2023-03-24 RX ADMIN — METFORMIN HYDROCHLORIDE 500 MILLIGRAM(S): 850 TABLET ORAL at 17:13

## 2023-03-24 RX ADMIN — Medication 100 MILLIGRAM(S): at 17:13

## 2023-03-24 RX ADMIN — ATORVASTATIN CALCIUM 40 MILLIGRAM(S): 80 TABLET, FILM COATED ORAL at 21:54

## 2023-03-24 RX ADMIN — Medication 0.25 MILLIGRAM(S): at 00:50

## 2023-03-24 RX ADMIN — PANTOPRAZOLE SODIUM 40 MILLIGRAM(S): 20 TABLET, DELAYED RELEASE ORAL at 09:13

## 2023-03-24 RX ADMIN — OXYCODONE HYDROCHLORIDE 5 MILLIGRAM(S): 5 TABLET ORAL at 04:14

## 2023-03-24 NOTE — PROGRESS NOTE ADULT - NS ATTEND AMEND GEN_ALL_CORE FT
patient is seen at bedside  does not feel as good as yesterday  FS well controlled  agree with plan above
agree with the plan above  patient seen at bedside  will try to simplify the regimen   can dc lantus tomorrow if still doing well

## 2023-03-25 ENCOUNTER — TRANSCRIPTION ENCOUNTER (OUTPATIENT)
Age: 78
End: 2023-03-25

## 2023-03-25 LAB
ANION GAP SERPL CALC-SCNC: 12 MMOL/L — SIGNIFICANT CHANGE UP (ref 5–17)
BUN SERPL-MCNC: 20.5 MG/DL — HIGH (ref 8–20)
CALCIUM SERPL-MCNC: 8.4 MG/DL — SIGNIFICANT CHANGE UP (ref 8.4–10.5)
CHLORIDE SERPL-SCNC: 102 MMOL/L — SIGNIFICANT CHANGE UP (ref 96–108)
CO2 SERPL-SCNC: 25 MMOL/L — SIGNIFICANT CHANGE UP (ref 22–29)
CREAT SERPL-MCNC: 0.99 MG/DL — SIGNIFICANT CHANGE UP (ref 0.5–1.3)
EGFR: 77 ML/MIN/1.73M2 — SIGNIFICANT CHANGE UP
GLUCOSE BLDC GLUCOMTR-MCNC: 115 MG/DL — HIGH (ref 70–99)
GLUCOSE BLDC GLUCOMTR-MCNC: 126 MG/DL — HIGH (ref 70–99)
GLUCOSE BLDC GLUCOMTR-MCNC: 161 MG/DL — HIGH (ref 70–99)
GLUCOSE BLDC GLUCOMTR-MCNC: 176 MG/DL — HIGH (ref 70–99)
GLUCOSE BLDC GLUCOMTR-MCNC: 184 MG/DL — HIGH (ref 70–99)
GLUCOSE SERPL-MCNC: 113 MG/DL — HIGH (ref 70–99)
HCT VFR BLD CALC: 31.6 % — LOW (ref 39–50)
HGB BLD-MCNC: 10.5 G/DL — LOW (ref 13–17)
MAGNESIUM SERPL-MCNC: 2.2 MG/DL — SIGNIFICANT CHANGE UP (ref 1.8–2.6)
MCHC RBC-ENTMCNC: 30.1 PG — SIGNIFICANT CHANGE UP (ref 27–34)
MCHC RBC-ENTMCNC: 33.2 GM/DL — SIGNIFICANT CHANGE UP (ref 32–36)
MCV RBC AUTO: 90.5 FL — SIGNIFICANT CHANGE UP (ref 80–100)
PLATELET # BLD AUTO: 189 K/UL — SIGNIFICANT CHANGE UP (ref 150–400)
POTASSIUM SERPL-MCNC: 4 MMOL/L — SIGNIFICANT CHANGE UP (ref 3.5–5.3)
POTASSIUM SERPL-SCNC: 4 MMOL/L — SIGNIFICANT CHANGE UP (ref 3.5–5.3)
RBC # BLD: 3.49 M/UL — LOW (ref 4.2–5.8)
RBC # FLD: 14.6 % — HIGH (ref 10.3–14.5)
SODIUM SERPL-SCNC: 139 MMOL/L — SIGNIFICANT CHANGE UP (ref 135–145)
WBC # BLD: 12.51 K/UL — HIGH (ref 3.8–10.5)
WBC # FLD AUTO: 12.51 K/UL — HIGH (ref 3.8–10.5)

## 2023-03-25 PROCEDURE — 71045 X-RAY EXAM CHEST 1 VIEW: CPT | Mod: 26,77

## 2023-03-25 PROCEDURE — 71045 X-RAY EXAM CHEST 1 VIEW: CPT | Mod: 26

## 2023-03-25 PROCEDURE — 99223 1ST HOSP IP/OBS HIGH 75: CPT

## 2023-03-25 RX ORDER — METFORMIN HYDROCHLORIDE 850 MG/1
500 TABLET ORAL THREE TIMES A DAY
Refills: 0 | Status: DISCONTINUED | OUTPATIENT
Start: 2023-03-26 | End: 2023-03-27

## 2023-03-25 RX ADMIN — Medication 81 MILLIGRAM(S): at 12:13

## 2023-03-25 RX ADMIN — SENNA PLUS 2 TABLET(S): 8.6 TABLET ORAL at 21:24

## 2023-03-25 RX ADMIN — Medication 2: at 21:20

## 2023-03-25 RX ADMIN — POLYETHYLENE GLYCOL 3350 17 GRAM(S): 17 POWDER, FOR SOLUTION ORAL at 12:13

## 2023-03-25 RX ADMIN — PANTOPRAZOLE SODIUM 40 MILLIGRAM(S): 20 TABLET, DELAYED RELEASE ORAL at 12:13

## 2023-03-25 RX ADMIN — ATORVASTATIN CALCIUM 40 MILLIGRAM(S): 80 TABLET, FILM COATED ORAL at 21:15

## 2023-03-25 RX ADMIN — Medication 20 MILLIGRAM(S): at 06:00

## 2023-03-25 RX ADMIN — Medication 20 MILLIEQUIVALENT(S): at 12:13

## 2023-03-25 RX ADMIN — ENOXAPARIN SODIUM 40 MILLIGRAM(S): 100 INJECTION SUBCUTANEOUS at 21:16

## 2023-03-25 RX ADMIN — LINAGLIPTIN 5 MILLIGRAM(S): 5 TABLET, FILM COATED ORAL at 12:13

## 2023-03-25 RX ADMIN — Medication 10 MILLIGRAM(S): at 08:31

## 2023-03-25 RX ADMIN — Medication 2: at 08:36

## 2023-03-25 RX ADMIN — METFORMIN HYDROCHLORIDE 500 MILLIGRAM(S): 850 TABLET ORAL at 12:13

## 2023-03-25 RX ADMIN — TAMSULOSIN HYDROCHLORIDE 0.4 MILLIGRAM(S): 0.4 CAPSULE ORAL at 21:24

## 2023-03-25 RX ADMIN — Medication 2: at 12:23

## 2023-03-25 RX ADMIN — Medication 100 MILLIGRAM(S): at 05:31

## 2023-03-25 NOTE — BH CONSULTATION LIAISON ASSESSMENT NOTE - NSBHCONSULTRECOMMENDOTHER_PSY_A_CORE FT
Referral to outpatient psychiatric provider for therapy and possible evaluation for psychotropic medication management.

## 2023-03-25 NOTE — BH CONSULTATION LIAISON ASSESSMENT NOTE - VIOLENCE PROTECTIVE FACTORS:
Residential stability/Relationship stability/Employment stability/Affective Stability/Insight into violence risk and need for management/treatment

## 2023-03-25 NOTE — BH CONSULTATION LIAISON ASSESSMENT NOTE - NSBHCHARTREVIEWVS_PSY_A_CORE FT
Vital Signs Last 24 Hrs  T(C): 36.7 (25 Mar 2023 16:25), Max: 36.7 (25 Mar 2023 16:25)  T(F): 98.1 (25 Mar 2023 16:25), Max: 98.1 (25 Mar 2023 16:25)  HR: 83 (25 Mar 2023 16:25) (82 - 84)  BP: 115/74 (25 Mar 2023 16:25) (115/74 - 138/77)  BP(mean): --  RR: 18 (25 Mar 2023 16:25) (18 - 18)  SpO2: 95% (25 Mar 2023 16:25) (95% - 95%)    Parameters below as of 25 Mar 2023 16:25  Patient On (Oxygen Delivery Method): room air

## 2023-03-25 NOTE — BH CONSULTATION LIAISON ASSESSMENT NOTE - HPI (INCLUDE ILLNESS QUALITY, SEVERITY, DURATION, TIMING, CONTEXT, MODIFYING FACTORS, ASSOCIATED SIGNS AND SYMPTOMS)
Patient is a 77 year old, male; domiciled with wife; ; noncaregiver; retired as a former ; no formal past psychiatric history; no psychiatric  hospitalizations; no known suicide attempts; no known history of violence or arrests; no active substance abuse or known history of complicated withdrawal; PMHx of HTN, HLD, BPH, and NIDDM2, PSHx CABG most recently on 3/21; presenting with depression.

## 2023-03-25 NOTE — BH CONSULTATION LIAISON ASSESSMENT NOTE - RISK ASSESSMENT
RF: Presenting symptoms of depression  PF: No psychiatric hx, no prior hospitalizations, depression limited to morning hours of the day, no substance use, housing stability, , responsibility to adult daughter, adequate insight and judgement, [facility]

## 2023-03-25 NOTE — BH CONSULTATION LIAISON ASSESSMENT NOTE - NSSUICPROTFACT_PSY_ALL_CORE
Responsibility to children, family, or others/Identifies reasons for living/Supportive social network of family or friends/Fear of death or the actual act of killing self/Positive therapeutic relationships/Ability to cope with stress/[place] beliefs

## 2023-03-25 NOTE — BH CONSULTATION LIAISON ASSESSMENT NOTE - CURRENT MEDICATION
MEDICATIONS  (STANDING):  aspirin enteric coated 81 milliGRAM(s) Oral daily  atorvastatin 40 milliGRAM(s) Oral at bedtime  enoxaparin Injectable 40 milliGRAM(s) SubCutaneous every 24 hours  furosemide    Tablet 20 milliGRAM(s) Oral daily  glucagon  Injectable 1 milliGRAM(s) IntraMuscular once  insulin lispro (ADMELOG) corrective regimen sliding scale   SubCutaneous Before meals and at bedtime  linagliptin 5 milliGRAM(s) Oral daily  metoprolol tartrate 100 milliGRAM(s) Oral two times a day  pantoprazole    Tablet 40 milliGRAM(s) Oral daily  polyethylene glycol 3350 17 Gram(s) Oral daily  potassium chloride    Tablet ER 20 milliEquivalent(s) Oral daily  senna 2 Tablet(s) Oral at bedtime  tamsulosin 0.4 milliGRAM(s) Oral at bedtime    MEDICATIONS  (PRN):  acetaminophen     Tablet .. 650 milliGRAM(s) Oral every 6 hours PRN Mild Pain (1 - 3)  oxyCODONE    IR 5 milliGRAM(s) Oral every 6 hours PRN Moderate Pain (4 - 6)  oxyCODONE    IR 10 milliGRAM(s) Oral every 6 hours PRN Severe Pain (7 - 10)

## 2023-03-25 NOTE — BH CONSULTATION LIAISON ASSESSMENT NOTE - SUMMARY
Patient is a 77 year old, male; domiciled with wife; ; noncaregiver; retired as a former ; no formal past psychiatric history; no psychiatric  hospitalizations; no known suicide attempts; no known history of violence or arrests; no active substance abuse or known history of complicated withdrawal; PMHx of HTN, HLD, BPH, and NIDDM2, PSHx CABG most recently on 3/21; presenting with depression.     Upon assessment, pt is well related, pleasant and states that he only feels depressed in the morning when he first wakes up. Pt states that as the day goes on, he no longer feels depressed; ie at the time of interview, pt denied any feelings of depression. Pt presents with linear and logical thought process along with appropriate affect. Pt adds that he is thankful he went to the doctor when he did and has been able to recover from such a major surgery. Furthermore, pt states that he recently was notified that he is owed $5K from his pension; pt states when he has negative thoughts, he thinks about the extra money he will be receiving. Pt adds that he oftentimes prays to God when he is experiencing feelings of depression. Pt denies any thoughts of suicide, HI, AH, VH or paranoia. Pt endorses his wife and adult daughter as protective factors as well.  Pt at this time is not an acute danger to self or others and does not warrant further inpatient psychiatric evaluation. Pt however would benefit from receiving a referral to outpatient services upon discharge for ongoing therapy and possible medication management.

## 2023-03-26 LAB
ANION GAP SERPL CALC-SCNC: 12 MMOL/L — SIGNIFICANT CHANGE UP (ref 5–17)
BUN SERPL-MCNC: 28.5 MG/DL — HIGH (ref 8–20)
CALCIUM SERPL-MCNC: 7.9 MG/DL — LOW (ref 8.4–10.5)
CHLORIDE SERPL-SCNC: 103 MMOL/L — SIGNIFICANT CHANGE UP (ref 96–108)
CO2 SERPL-SCNC: 27 MMOL/L — SIGNIFICANT CHANGE UP (ref 22–29)
CREAT SERPL-MCNC: 1.15 MG/DL — SIGNIFICANT CHANGE UP (ref 0.5–1.3)
EGFR: 65 ML/MIN/1.73M2 — SIGNIFICANT CHANGE UP
GLUCOSE BLDC GLUCOMTR-MCNC: 142 MG/DL — HIGH (ref 70–99)
GLUCOSE BLDC GLUCOMTR-MCNC: 156 MG/DL — HIGH (ref 70–99)
GLUCOSE BLDC GLUCOMTR-MCNC: 174 MG/DL — HIGH (ref 70–99)
GLUCOSE BLDC GLUCOMTR-MCNC: 196 MG/DL — HIGH (ref 70–99)
GLUCOSE SERPL-MCNC: 150 MG/DL — HIGH (ref 70–99)
HCT VFR BLD CALC: 29.6 % — LOW (ref 39–50)
HGB BLD-MCNC: 9.8 G/DL — LOW (ref 13–17)
MAGNESIUM SERPL-MCNC: 2 MG/DL — SIGNIFICANT CHANGE UP (ref 1.6–2.6)
MCHC RBC-ENTMCNC: 30.3 PG — SIGNIFICANT CHANGE UP (ref 27–34)
MCHC RBC-ENTMCNC: 33.1 GM/DL — SIGNIFICANT CHANGE UP (ref 32–36)
MCV RBC AUTO: 91.6 FL — SIGNIFICANT CHANGE UP (ref 80–100)
PLATELET # BLD AUTO: 186 K/UL — SIGNIFICANT CHANGE UP (ref 150–400)
POTASSIUM SERPL-MCNC: 4 MMOL/L — SIGNIFICANT CHANGE UP (ref 3.5–5.3)
POTASSIUM SERPL-SCNC: 4 MMOL/L — SIGNIFICANT CHANGE UP (ref 3.5–5.3)
RBC # BLD: 3.23 M/UL — LOW (ref 4.2–5.8)
RBC # FLD: 14.2 % — SIGNIFICANT CHANGE UP (ref 10.3–14.5)
SODIUM SERPL-SCNC: 141 MMOL/L — SIGNIFICANT CHANGE UP (ref 135–145)
WBC # BLD: 6.95 K/UL — SIGNIFICANT CHANGE UP (ref 3.8–10.5)
WBC # FLD AUTO: 6.95 K/UL — SIGNIFICANT CHANGE UP (ref 3.8–10.5)

## 2023-03-26 PROCEDURE — 93308 TTE F-UP OR LMTD: CPT | Mod: 26

## 2023-03-26 PROCEDURE — 74018 RADEX ABDOMEN 1 VIEW: CPT | Mod: 26

## 2023-03-26 PROCEDURE — 71045 X-RAY EXAM CHEST 1 VIEW: CPT | Mod: 26

## 2023-03-26 PROCEDURE — 99024 POSTOP FOLLOW-UP VISIT: CPT

## 2023-03-26 PROCEDURE — 99232 SBSQ HOSP IP/OBS MODERATE 35: CPT

## 2023-03-26 RX ORDER — MINERAL OIL
133 OIL (ML) MISCELLANEOUS ONCE
Refills: 0 | Status: COMPLETED | OUTPATIENT
Start: 2023-03-26 | End: 2023-03-26

## 2023-03-26 RX ORDER — SORBITOL SOLUTION 70 %
30 SOLUTION, ORAL MISCELLANEOUS ONCE
Refills: 0 | Status: COMPLETED | OUTPATIENT
Start: 2023-03-26 | End: 2023-03-26

## 2023-03-26 RX ADMIN — LINAGLIPTIN 5 MILLIGRAM(S): 5 TABLET, FILM COATED ORAL at 08:20

## 2023-03-26 RX ADMIN — SENNA PLUS 2 TABLET(S): 8.6 TABLET ORAL at 21:13

## 2023-03-26 RX ADMIN — Medication 2: at 17:07

## 2023-03-26 RX ADMIN — ATORVASTATIN CALCIUM 40 MILLIGRAM(S): 80 TABLET, FILM COATED ORAL at 21:14

## 2023-03-26 RX ADMIN — Medication 30 MILLILITER(S): at 12:57

## 2023-03-26 RX ADMIN — PANTOPRAZOLE SODIUM 40 MILLIGRAM(S): 20 TABLET, DELAYED RELEASE ORAL at 08:20

## 2023-03-26 RX ADMIN — METFORMIN HYDROCHLORIDE 500 MILLIGRAM(S): 850 TABLET ORAL at 05:45

## 2023-03-26 RX ADMIN — Medication 133 MILLILITER(S): at 17:14

## 2023-03-26 RX ADMIN — POLYETHYLENE GLYCOL 3350 17 GRAM(S): 17 POWDER, FOR SOLUTION ORAL at 08:21

## 2023-03-26 RX ADMIN — Medication 2: at 12:57

## 2023-03-26 RX ADMIN — METFORMIN HYDROCHLORIDE 500 MILLIGRAM(S): 850 TABLET ORAL at 21:14

## 2023-03-26 RX ADMIN — Medication 81 MILLIGRAM(S): at 08:20

## 2023-03-26 RX ADMIN — Medication 100 MILLIGRAM(S): at 17:07

## 2023-03-26 RX ADMIN — Medication 20 MILLIEQUIVALENT(S): at 08:21

## 2023-03-26 RX ADMIN — Medication 100 MILLIGRAM(S): at 05:45

## 2023-03-26 RX ADMIN — METFORMIN HYDROCHLORIDE 500 MILLIGRAM(S): 850 TABLET ORAL at 12:58

## 2023-03-26 RX ADMIN — ENOXAPARIN SODIUM 40 MILLIGRAM(S): 100 INJECTION SUBCUTANEOUS at 21:14

## 2023-03-26 RX ADMIN — Medication 20 MILLIGRAM(S): at 05:45

## 2023-03-26 RX ADMIN — TAMSULOSIN HYDROCHLORIDE 0.4 MILLIGRAM(S): 0.4 CAPSULE ORAL at 21:14

## 2023-03-26 RX ADMIN — Medication 2: at 08:21

## 2023-03-26 NOTE — DISCHARGE NOTE PROVIDER - NSDCCPTREATMENT_GEN_ALL_CORE_FT
PRINCIPAL PROCEDURE  Procedure: CABG, with MARIA VICTORIA  Findings and Treatment: AMARIS-LAD , SVG-OM, SVG- PDA   endoscopic vein harvest of bilateral greater sapnehous vein

## 2023-03-26 NOTE — DISCHARGE NOTE PROVIDER - NSDCMRMEDTOKEN_GEN_ALL_CORE_FT
atorvastatin: 20 milligram(s) orally once a day (at bedtime)  benazepril 5 mg oral tablet: 1 tab(s) orally once a day  Coreg 12.5 mg oral tablet: 1 tab(s) orally 2 times a day  Flomax 0.4 mg oral capsule: 1 cap(s) orally once a day  glipiZIDE 10 mg oral tablet, extended release: 1 tab(s) orally once a day  Januvia: 50 milligram(s) orally once a day  metFORMIN 1000 mg oral tablet: 1 tab(s) orally 2 times a day  omeprazole 40 mg oral delayed release capsule: 1 cap(s) orally once a day   acetaminophen 325 mg oral tablet: 2 tab(s) orally every 6 hours As needed Mild Pain (1 - 3)  aspirin 81 mg oral delayed release tablet: 1 tab(s) orally once a day  atorvastatin: 20 milligram(s) orally once a day (at bedtime)  Flomax 0.4 mg oral capsule: 1 cap(s) orally once a day  furosemide 20 mg oral tablet: 1 tab(s) orally once a day  linagliptin 5 mg oral tablet: 1 tab(s) orally once a day  metFORMIN 500 mg oral tablet: 1 tab(s) orally 3 times a day  metoprolol tartrate 100 mg oral tablet: 1 tab(s) orally 2 times a day  omeprazole 40 mg oral delayed release capsule: 1 cap(s) orally once a day  oxyCODONE 5 mg oral tablet: 1 tab(s) orally every 6 hours as needed for  severe pain MDD: 4 tabs  potassium chloride 20 mEq oral tablet, extended release: 1 tab(s) orally once a day  senna leaf extract oral tablet: 2 tab(s) orally once a day (at bedtime) as needed for  constipation

## 2023-03-26 NOTE — DISCHARGE NOTE PROVIDER - CARE PROVIDER_API CALL
Ahmet Goel; SUBHA)  Surgery; Thoracic and Cardiac Surgery  [address] [place] [address]  Phone: [phone]  Fax: [phone]  Follow Up Time:     Matthew Fink)  Cardiovascular Disease; Interventional Cardiology  [address]  [place] [address]  Phone: [phone]  Fax: [phone]  Follow Up Time:

## 2023-03-26 NOTE — DISCHARGE NOTE PROVIDER - NSDCFUADDINST_GEN_ALL_CORE_FT
Please call the Cardiothoracic Surgery office at [phone] if you are experiencing any shortness of breath, chest pain, fevers or chills, drainage from the incisions, persistent nausea, vomiting or if you have any questions about your medications. If the symptoms are severe, call 911 and go to the nearest hospital. You can also call [phone] for an emergency [facility] ambulance, which will take you to the closest [facility].    If you need any assistance for making any appointments for a new consult or referral in any specialty, please call our [facility] Clinical Coordination Center at [phone].

## 2023-03-26 NOTE — DISCHARGE NOTE PROVIDER - NSDCCPCAREPLAN_GEN_ALL_CORE_FT
No PRINCIPAL DISCHARGE DIAGNOSIS  Diagnosis: CAD (coronary artery disease)  Assessment and Plan of Treatment: Please refer to discharge instructions in your folder.  Shower daily with soap and water to the incision. No bathing/hot tubs/pools/swimming. No lotions/creams to incisions.  Take all your medications as prescribed.  Keep your follow up appointments.      SECONDARY DISCHARGE DIAGNOSES  Diagnosis: DM2 (diabetes mellitus, type 2)  Assessment and Plan of Treatment: It is extremely important to follow a diabetic (consistent carbohydrates, LOW/NO ADDED SUGAR) diet and monitor your glucose (sugar) levels. You have an increased risk of complications, including wound infections, due to the diabetes.  1. Check your glucoses 3-4 times daily before meals and bedtime.   2. Keep a log of your glucose levels every day.   3. Make an appointment to meet with your primary care provider or endocrinologist within a week.   4. Take ALL of your medications as prescribed. Only hold medications for low glucose levels (< 80) or if you are symptomatic (dizzy, sweating, lightheaded).  5. Ideally, we would like all of the glucose levels to be between .   You may have been discharged on different medications than you were taking before. Your body reacts differently to the medications after surgery. Please continue to take the medications as prescribed and notify the office, nurse practitioner or your PCP if you have any concerns right away.

## 2023-03-26 NOTE — DISCHARGE NOTE PROVIDER - NSDCFUADDAPPT_GEN_ALL_CORE_FT
Please follow up with Dr. Goel by calling the CT Surgery office at [phone] on the next open business day to make an appointment.    **Please arrive at [facility] ONE HOUR PRIOR TO APPOINTMENT TIME to get a CHEST XRAY (script in folder). Go directly to the Radiology Department.***    The cardiac surgery office is located at [facility], first floor. Take a left at the end of the lobby until the end of that geiger (past the elevator bank). Make a left and the office is on your right across from the elevators.    Your Care Navigator Nurse Practitioner will be in touch to see you in your home within a few days from discharge. The Follow Your Heart program can help ensure you understand your medications, discharge instructions and answer any questions you may have at that time. They are also a great source to address concerns during the day and may be reached at [phone].    Please follow up with your Cardiologist and Primary Care Physician 2-4 weeks from discharge. Please follow up with Dr. Goel on 4/7/23 1:30pm.    **Please arrive at [facility] ONE HOUR PRIOR TO APPOINTMENT TIME to get a CHEST XRAY (script in folder). Go directly to the Radiology Department.***    The cardiac surgery office is located at [facility], first floor. Take a left at the end of the lobby until the end of that geiger (past the elevator bank). Make a left and the office is on your right across from the elevators.    Your Care Navigator Nurse Practitioner will be in touch to see you in your home within a few days from discharge. The Follow Your Heart program can help ensure you understand your medications, discharge instructions and answer any questions you may have at that time. They are also a great source to address concerns during the day and may be reached at [phone].    Please follow up with your Cardiologist and Primary Care Physician 2-4 weeks from discharge.

## 2023-03-26 NOTE — DISCHARGE NOTE PROVIDER - HOSPITAL COURSE
77 year old Male with a medical history of HTN, HLD, BPH,  intention tremor, and type 2 DM (HA1c 6.8 on Metformin, Januvia, Glipizide) who was seen by his Cardiologist outpatient for chest pain with minimal exertion, radiating down b/l arms, relieved by rest, s/p elective LHC/RHC at [facility] 3/20/23 revealing Multivessel CAD, with patient transferred to [facility] for CABG evaluation 3/20/23. Patient underwent CABG x 3 with Dr. Goel 3/21/23. Postoperative course significant for ABLA (remains stable, s/p 1u PRBC 3/23). Patient remains hemodynamically stable and stable from a respiratory standpoint at this time. Plan for discharge home later today as per Dr. Castañeda (covering physician for Dr. Goel).

## 2023-03-27 ENCOUNTER — TRANSCRIPTION ENCOUNTER (OUTPATIENT)
Age: 78
End: 2023-03-27

## 2023-03-27 VITALS
RESPIRATION RATE: 20 BRPM | DIASTOLIC BLOOD PRESSURE: 68 MMHG | HEART RATE: 108 BPM | OXYGEN SATURATION: 97 % | SYSTOLIC BLOOD PRESSURE: 112 MMHG | TEMPERATURE: 98 F

## 2023-03-27 PROBLEM — I10 ESSENTIAL (PRIMARY) HYPERTENSION: Chronic | Status: ACTIVE | Noted: 2023-03-20

## 2023-03-27 PROBLEM — N40.0 BENIGN PROSTATIC HYPERPLASIA WITHOUT LOWER URINARY TRACT SYMPTOMS: Chronic | Status: ACTIVE | Noted: 2023-03-20

## 2023-03-27 PROBLEM — E11.9 TYPE 2 DIABETES MELLITUS WITHOUT COMPLICATIONS: Chronic | Status: ACTIVE | Noted: 2023-03-20

## 2023-03-27 PROBLEM — E78.5 HYPERLIPIDEMIA, UNSPECIFIED: Chronic | Status: ACTIVE | Noted: 2023-03-20

## 2023-03-27 LAB
ANION GAP SERPL CALC-SCNC: 10 MMOL/L — SIGNIFICANT CHANGE UP (ref 5–17)
BUN SERPL-MCNC: 23.7 MG/DL — HIGH (ref 8–20)
CALCIUM SERPL-MCNC: 8.2 MG/DL — LOW (ref 8.4–10.5)
CHLORIDE SERPL-SCNC: 103 MMOL/L — SIGNIFICANT CHANGE UP (ref 96–108)
CO2 SERPL-SCNC: 28 MMOL/L — SIGNIFICANT CHANGE UP (ref 22–29)
CREAT SERPL-MCNC: 1.14 MG/DL — SIGNIFICANT CHANGE UP (ref 0.5–1.3)
EGFR: 65 ML/MIN/1.73M2 — SIGNIFICANT CHANGE UP
GLUCOSE BLDC GLUCOMTR-MCNC: 155 MG/DL — HIGH (ref 70–99)
GLUCOSE BLDC GLUCOMTR-MCNC: 165 MG/DL — HIGH (ref 70–99)
GLUCOSE SERPL-MCNC: 124 MG/DL — HIGH (ref 70–99)
HCT VFR BLD CALC: 28.8 % — LOW (ref 39–50)
HGB BLD-MCNC: 9.7 G/DL — LOW (ref 13–17)
MAGNESIUM SERPL-MCNC: 1.9 MG/DL — SIGNIFICANT CHANGE UP (ref 1.8–2.6)
MCHC RBC-ENTMCNC: 31 PG — SIGNIFICANT CHANGE UP (ref 27–34)
MCHC RBC-ENTMCNC: 33.7 GM/DL — SIGNIFICANT CHANGE UP (ref 32–36)
MCV RBC AUTO: 92 FL — SIGNIFICANT CHANGE UP (ref 80–100)
PLATELET # BLD AUTO: 200 K/UL — SIGNIFICANT CHANGE UP (ref 150–400)
POTASSIUM SERPL-MCNC: 4.1 MMOL/L — SIGNIFICANT CHANGE UP (ref 3.5–5.3)
POTASSIUM SERPL-SCNC: 4.1 MMOL/L — SIGNIFICANT CHANGE UP (ref 3.5–5.3)
RBC # BLD: 3.13 M/UL — LOW (ref 4.2–5.8)
RBC # FLD: 14.2 % — SIGNIFICANT CHANGE UP (ref 10.3–14.5)
SODIUM SERPL-SCNC: 141 MMOL/L — SIGNIFICANT CHANGE UP (ref 135–145)
WBC # BLD: 7.97 K/UL — SIGNIFICANT CHANGE UP (ref 3.8–10.5)
WBC # FLD AUTO: 7.97 K/UL — SIGNIFICANT CHANGE UP (ref 3.8–10.5)

## 2023-03-27 PROCEDURE — 85014 HEMATOCRIT: CPT

## 2023-03-27 PROCEDURE — 74018 RADEX ABDOMEN 1 VIEW: CPT

## 2023-03-27 PROCEDURE — 80076 HEPATIC FUNCTION PANEL: CPT

## 2023-03-27 PROCEDURE — 82962 GLUCOSE BLOOD TEST: CPT

## 2023-03-27 PROCEDURE — 85025 COMPLETE CBC W/AUTO DIFF WBC: CPT

## 2023-03-27 PROCEDURE — 97530 THERAPEUTIC ACTIVITIES: CPT

## 2023-03-27 PROCEDURE — 87640 STAPH A DNA AMP PROBE: CPT

## 2023-03-27 PROCEDURE — 82947 ASSAY GLUCOSE BLOOD QUANT: CPT

## 2023-03-27 PROCEDURE — 84443 ASSAY THYROID STIM HORMONE: CPT

## 2023-03-27 PROCEDURE — 97116 GAIT TRAINING THERAPY: CPT

## 2023-03-27 PROCEDURE — 84484 ASSAY OF TROPONIN QUANT: CPT

## 2023-03-27 PROCEDURE — 83690 ASSAY OF LIPASE: CPT

## 2023-03-27 PROCEDURE — 85610 PROTHROMBIN TIME: CPT

## 2023-03-27 PROCEDURE — 84439 ASSAY OF FREE THYROXINE: CPT

## 2023-03-27 PROCEDURE — P9045: CPT

## 2023-03-27 PROCEDURE — 99024 POSTOP FOLLOW-UP VISIT: CPT

## 2023-03-27 PROCEDURE — C8929: CPT

## 2023-03-27 PROCEDURE — 80053 COMPREHEN METABOLIC PANEL: CPT

## 2023-03-27 PROCEDURE — 93312 ECHO TRANSESOPHAGEAL: CPT

## 2023-03-27 PROCEDURE — 92610 EVALUATE SWALLOWING FUNCTION: CPT

## 2023-03-27 PROCEDURE — 86923 COMPATIBILITY TEST ELECTRIC: CPT

## 2023-03-27 PROCEDURE — 83735 ASSAY OF MAGNESIUM: CPT

## 2023-03-27 PROCEDURE — 82150 ASSAY OF AMYLASE: CPT

## 2023-03-27 PROCEDURE — 93325 DOPPLER ECHO COLOR FLOW MAPG: CPT

## 2023-03-27 PROCEDURE — 93880 EXTRACRANIAL BILAT STUDY: CPT

## 2023-03-27 PROCEDURE — 86850 RBC ANTIBODY SCREEN: CPT

## 2023-03-27 PROCEDURE — 93320 DOPPLER ECHO COMPLETE: CPT

## 2023-03-27 PROCEDURE — 83880 ASSAY OF NATRIURETIC PEPTIDE: CPT

## 2023-03-27 PROCEDURE — 85576 BLOOD PLATELET AGGREGATION: CPT

## 2023-03-27 PROCEDURE — 84132 ASSAY OF SERUM POTASSIUM: CPT

## 2023-03-27 PROCEDURE — 92526 ORAL FUNCTION THERAPY: CPT

## 2023-03-27 PROCEDURE — 80061 LIPID PANEL: CPT

## 2023-03-27 PROCEDURE — P9037: CPT

## 2023-03-27 PROCEDURE — C1889: CPT

## 2023-03-27 PROCEDURE — 85730 THROMBOPLASTIN TIME PARTIAL: CPT

## 2023-03-27 PROCEDURE — 86901 BLOOD TYPING SEROLOGIC RH(D): CPT

## 2023-03-27 PROCEDURE — 84295 ASSAY OF SERUM SODIUM: CPT

## 2023-03-27 PROCEDURE — P9100: CPT

## 2023-03-27 PROCEDURE — 82553 CREATINE MB FRACTION: CPT

## 2023-03-27 PROCEDURE — 85027 COMPLETE CBC AUTOMATED: CPT

## 2023-03-27 PROCEDURE — 71045 X-RAY EXAM CHEST 1 VIEW: CPT | Mod: 26

## 2023-03-27 PROCEDURE — 94010 BREATHING CAPACITY TEST: CPT

## 2023-03-27 PROCEDURE — 86900 BLOOD TYPING SEROLOGIC ABO: CPT

## 2023-03-27 PROCEDURE — 87641 MR-STAPH DNA AMP PROBE: CPT

## 2023-03-27 PROCEDURE — C1751: CPT

## 2023-03-27 PROCEDURE — 81001 URINALYSIS AUTO W/SCOPE: CPT

## 2023-03-27 PROCEDURE — 36600 WITHDRAWAL OF ARTERIAL BLOOD: CPT

## 2023-03-27 PROCEDURE — 82803 BLOOD GASES ANY COMBINATION: CPT

## 2023-03-27 PROCEDURE — 83605 ASSAY OF LACTIC ACID: CPT

## 2023-03-27 PROCEDURE — 97163 PT EVAL HIGH COMPLEX 45 MIN: CPT

## 2023-03-27 PROCEDURE — 93005 ELECTROCARDIOGRAM TRACING: CPT

## 2023-03-27 PROCEDURE — 82330 ASSAY OF CALCIUM: CPT

## 2023-03-27 PROCEDURE — P9016: CPT

## 2023-03-27 PROCEDURE — 71045 X-RAY EXAM CHEST 1 VIEW: CPT

## 2023-03-27 PROCEDURE — C1769: CPT

## 2023-03-27 PROCEDURE — 83036 HEMOGLOBIN GLYCOSYLATED A1C: CPT

## 2023-03-27 PROCEDURE — 94002 VENT MGMT INPAT INIT DAY: CPT

## 2023-03-27 PROCEDURE — 36415 COLL VENOUS BLD VENIPUNCTURE: CPT

## 2023-03-27 PROCEDURE — 82550 ASSAY OF CK (CPK): CPT

## 2023-03-27 PROCEDURE — 85018 HEMOGLOBIN: CPT

## 2023-03-27 PROCEDURE — 82435 ASSAY OF BLOOD CHLORIDE: CPT

## 2023-03-27 PROCEDURE — 84134 ASSAY OF PREALBUMIN: CPT

## 2023-03-27 PROCEDURE — 80048 BASIC METABOLIC PNL TOTAL CA: CPT

## 2023-03-27 PROCEDURE — 93308 TTE F-UP OR LMTD: CPT

## 2023-03-27 PROCEDURE — 36430 TRANSFUSION BLD/BLD COMPNT: CPT

## 2023-03-27 RX ORDER — OXYCODONE HYDROCHLORIDE 5 MG/1
1 TABLET ORAL
Qty: 28 | Refills: 0
Start: 2023-03-27 | End: 2023-04-02

## 2023-03-27 RX ORDER — METFORMIN HYDROCHLORIDE 850 MG/1
1 TABLET ORAL
Qty: 0 | Refills: 0 | DISCHARGE

## 2023-03-27 RX ORDER — METOPROLOL TARTRATE 50 MG
1 TABLET ORAL
Qty: 60 | Refills: 1
Start: 2023-03-27 | End: 2023-05-25

## 2023-03-27 RX ORDER — POTASSIUM CHLORIDE 20 MEQ
1 PACKET (EA) ORAL
Qty: 7 | Refills: 0
Start: 2023-03-27 | End: 2023-04-02

## 2023-03-27 RX ORDER — LINAGLIPTIN 5 MG/1
1 TABLET, FILM COATED ORAL
Qty: 30 | Refills: 1
Start: 2023-03-27 | End: 2023-05-25

## 2023-03-27 RX ORDER — ASPIRIN/CALCIUM CARB/MAGNESIUM 324 MG
1 TABLET ORAL
Qty: 30 | Refills: 1
Start: 2023-03-27 | End: 2023-05-25

## 2023-03-27 RX ORDER — MAGNESIUM SULFATE 500 MG/ML
2 VIAL (ML) INJECTION ONCE
Refills: 0 | Status: COMPLETED | OUTPATIENT
Start: 2023-03-27 | End: 2023-03-27

## 2023-03-27 RX ORDER — METFORMIN HYDROCHLORIDE 850 MG/1
1 TABLET ORAL
Qty: 90 | Refills: 1
Start: 2023-03-27 | End: 2023-05-25

## 2023-03-27 RX ORDER — BENAZEPRIL HYDROCHLORIDE 40 MG/1
1 TABLET ORAL
Qty: 0 | Refills: 0 | DISCHARGE

## 2023-03-27 RX ORDER — ACETAMINOPHEN 500 MG
2 TABLET ORAL
Qty: 0 | Refills: 0 | DISCHARGE
Start: 2023-03-27

## 2023-03-27 RX ORDER — SENNA PLUS 8.6 MG/1
2 TABLET ORAL
Qty: 14 | Refills: 0
Start: 2023-03-27 | End: 2023-04-02

## 2023-03-27 RX ORDER — CARVEDILOL PHOSPHATE 80 MG/1
1 CAPSULE, EXTENDED RELEASE ORAL
Qty: 0 | Refills: 0 | DISCHARGE

## 2023-03-27 RX ORDER — FUROSEMIDE 40 MG
1 TABLET ORAL
Qty: 7 | Refills: 0
Start: 2023-03-27 | End: 2023-04-02

## 2023-03-27 RX ADMIN — Medication 2: at 08:40

## 2023-03-27 RX ADMIN — Medication 81 MILLIGRAM(S): at 15:00

## 2023-03-27 RX ADMIN — METFORMIN HYDROCHLORIDE 500 MILLIGRAM(S): 850 TABLET ORAL at 05:31

## 2023-03-27 RX ADMIN — Medication 2: at 12:53

## 2023-03-27 RX ADMIN — Medication 100 MILLIGRAM(S): at 05:30

## 2023-03-27 RX ADMIN — Medication 20 MILLIGRAM(S): at 05:31

## 2023-03-27 RX ADMIN — PANTOPRAZOLE SODIUM 40 MILLIGRAM(S): 20 TABLET, DELAYED RELEASE ORAL at 15:03

## 2023-03-27 RX ADMIN — POLYETHYLENE GLYCOL 3350 17 GRAM(S): 17 POWDER, FOR SOLUTION ORAL at 15:01

## 2023-03-27 RX ADMIN — LINAGLIPTIN 5 MILLIGRAM(S): 5 TABLET, FILM COATED ORAL at 15:00

## 2023-03-27 RX ADMIN — Medication 20 MILLIEQUIVALENT(S): at 14:58

## 2023-03-27 RX ADMIN — Medication 25 GRAM(S): at 11:17

## 2023-03-27 NOTE — PROGRESS NOTE ADULT - PROBLEM SELECTOR PLAN 3
Pre-op testing from 3/14 with chemistry showing sCr 1.35  Unsure of patient baseline renal fxn  F/u renal panel
Pre-op testing from 3/14 with BMP showing sCr 1.35.  Unsure of patient baseline renal fxn.  Trending BMP.  sCr 0.99 yesterday 3/25.
Pre-op testing from 3/14 with BMP showing sCr 1.35.  Unsure of patient baseline renal fxn.  Trending BMP daily.
Pre-op testing from 3/14 with chemistry showing sCr 1.35  Unsure of patient baseline renal fxn  F/u renal panel
Pre-op testing from 3/14 with BMP showing sCr 1.35.  Unsure of patient baseline renal fxn.  Trending BMP.  sCr 0.99 yesterday 3/25.
Pre-op testing from 3/14 with BMP showing sCr 1.35.  Unsure of patient baseline renal fxn.  Trending BMP

## 2023-03-27 NOTE — PROGRESS NOTE ADULT - PROBLEM SELECTOR PLAN 6
DVT ppx with SCD boots and lovenox   GI prophylaxis with protonix
Lovenox and SCDs for DVT prophylaxis.  Protonix for GI prophylaxis.    Dispo: Home pending progress
Lovenox and SCDs for DVT prophylaxis.  Protonix for GI prophylaxis.    Dispo: Home likely today.  Plan to be discussed / reviewed with CT Surgery attending / team during AM rounds.
DVT ppx with SCD boots and lovenox   GI prophylaxis with protonix
Lovenox and SCDs for DVT prophylaxis.  Protonix for GI prophylaxis.    Dispo: Home likely today.  Plan to be discussed / reviewed with CT Surgery attending / team during AM rounds.
Lovenox and SCDs for DVT prophylaxis.  Protonix for GI prophylaxis.    Dispo: Home pending progress

## 2023-03-27 NOTE — PROGRESS NOTE ADULT - REASON FOR ADMISSION
Multivessel Disease

## 2023-03-27 NOTE — PROGRESS NOTE ADULT - PROBLEM SELECTOR PLAN 2
Type 2 DM with HA1c 6.8 on Metformin, Januvia, Glipizide at home (held while inpatient).  Strict BG control for sternal wound infection prophylaxis.  Discussed with endocrine today.  Lantus stopped.  Metformin increased to TID>  Plan for home on Metformin and tradjenta
A1c 6.8 on 3/14. On metformin, Januvia, glipizide at home (will hold during periop course)  Strict BG control for sternal wound infection prophylaxis  On Insulin drip, premeal insulin if able to take PO later today    Endocrine consult and diabetes education
A1c 6.8 on 3/14. On metformin, Januvia, glipizide at home (will hold during periop course)  Strict BG control for sternal wound infection prophylaxis  On lantus and premeal insulin   Endocrine not appreciated     Endocrine consulted and diabetes education
Type 2 DM with HA1c 6.8 on Metformin, Januvia, Glipizide at home (held while inpatient).  Strict BG control for sternal wound infection prophylaxis.  Continue Lantus, premeal, and sliding scale insulin.   Endocrine following.   Consistent carb diet.
Type 2 DM with HA1c 6.8 on Metformin, Januvia, Glipizide at home.  Strict BG control for sternal wound infection prophylaxis.  Endocrine following.   Lantus stopped 3/25 and Metformin increased to TID. Plan for home on Metformin and Tradjenta.
Type 2 DM with HA1c 6.8 on Metformin, Januvia, Glipizide at home.  Strict BG control for sternal wound infection prophylaxis.  Endocrine following.   Lantus stopped 3/25 and Metformin increased to TID. Plan for home on Metformin and Tradjenta.

## 2023-03-27 NOTE — DISCHARGE NOTE NURSING/CASE MANAGEMENT/SOCIAL WORK - NSDCFUADDAPPT_GEN_ALL_CORE_FT
Please follow up with Dr. Goel on 4/7/23 1:30pm.    **Please arrive at [facility] ONE HOUR PRIOR TO APPOINTMENT TIME to get a CHEST XRAY (script in folder). Go directly to the Radiology Department.***    The cardiac surgery office is located at [facility], first floor. Take a left at the end of the lobby until the end of that geiger (past the elevator bank). Make a left and the office is on your right across from the elevators.    Your Care Navigator Nurse Practitioner will be in touch to see you in your home within a few days from discharge. The Follow Your Heart program can help ensure you understand your medications, discharge instructions and answer any questions you may have at that time. They are also a great source to address concerns during the day and may be reached at [phone].    Please follow up with your Cardiologist and Primary Care Physician 2-4 weeks from discharge.

## 2023-03-27 NOTE — PROGRESS NOTE ADULT - SUBJECTIVE AND OBJECTIVE BOX
POD #5 s/p CABG X 3 (LIMA-LAD , SVG-OM, SVG- PDA)    PAST MEDICAL & SURGICAL HISTORY:  HTN (hypertension)  HLD (hyperlipidemia)  BPH without obstruction/lower urinary tract symptoms  DM2 (diabetes mellitus, type 2)  S/P bilateral inguinal hernia repair  S/P appendectomy    FAMILY HISTORY:  No pertinent family history    Brief Hospital Course: 77 year old Male with a medical history of HTN, HLD, BPH, intention tremor, and type 2 DM (HA1c 6.8 on Metformin, Januvia, Glipizide) who was seen by his Cardiologist outpatient for chest pain with minimal exertion, radiating down b/l arms, relieved by rest, s/p elective LHC/RHC at [facility] 3/20/23 revealing Multivessel CAD, with patient transferred to [facility] for CABG evaluation 3/20/23. Patient underwent CABG x 3 with Dr. Goel 3/21/23. Postoperative course significant for ABLA (remains stable, s/p 1u PRBC 3/23).      Significant recent/past 24 hr events: No overnight events reported.    Subjective: Patient lying in bed in NAD. +Tolerating diet. +Passing BMs since surgery. +Pain currently controlled. Denies fevers, chills, lightheadedness, dizziness, HA, CP, palpitations, SOB, cough, abdominal pain, N/V, diarrhea, numbness/tingling in extremities, or any other acute complaints. ROS negative x 10 systems except as noted above.    MEDICATIONS  (STANDING):  aspirin enteric coated 81 milliGRAM(s) Oral daily  atorvastatin 40 milliGRAM(s) Oral at bedtime  enoxaparin Injectable 40 milliGRAM(s) SubCutaneous every 24 hours  furosemide    Tablet 20 milliGRAM(s) Oral daily  insulin lispro (ADMELOG) corrective regimen sliding scale   SubCutaneous Before meals and at bedtime  linagliptin 5 milliGRAM(s) Oral daily  metFORMIN 500 milliGRAM(s) Oral three times a day  metoprolol tartrate 100 milliGRAM(s) Oral two times a day  pantoprazole    Tablet 40 milliGRAM(s) Oral daily  polyethylene glycol 3350 17 Gram(s) Oral daily  potassium chloride    Tablet ER 20 milliEquivalent(s) Oral daily  senna 2 Tablet(s) Oral at bedtime  tamsulosin 0.4 milliGRAM(s) Oral at bedtime    MEDICATIONS  (PRN):  acetaminophen     Tablet .. 650 milliGRAM(s) Oral every 6 hours PRN Mild Pain (1 - 3)  oxyCODONE    IR 5 milliGRAM(s) Oral every 6 hours PRN Moderate Pain (4 - 6)  oxyCODONE    IR 10 milliGRAM(s) Oral every 6 hours PRN Severe Pain (7 - 10)    Allergies:  chocolate (Rash)  No Known Drug Allergies    Vitals   T(C): 36.8 (26 Mar 2023 00:32), Max: 36.8 (26 Mar 2023 00:32)  T(F): 98.2 (26 Mar 2023 00:32), Max: 98.2 (26 Mar 2023 00:32)  HR: 94 (26 Mar 2023 00:32) (82 - 94)  BP: 129/69 (26 Mar 2023 00:32) (109/66 - 138/77)  RR: 18 (26 Mar 2023 00:32) (17 - 18)  SpO2: 94% (26 Mar 2023 00:32) (94% - 95%)  O2 Parameters below as of 26 Mar 2023 00:32  Patient On (Oxygen Delivery Method): room air    I&O's Detail    24 Mar 2023 07:01  -  25 Mar 2023 07:00  --------------------------------------------------------  IN:  Total IN: 0 mL    OUT:    Chest Tube (mL): 0 mL    Indwelling Catheter - Urethral (mL): 400 mL  Total OUT: 400 mL    Total NET: -400 mL      25 Mar 2023 07:01  -  26 Mar 2023 02:24  --------------------------------------------------------  IN:  Total IN: 0 mL    OUT:    Voided (mL): 200 mL  Total OUT: 200 mL    Total NET: -200 mL    Physical Exam  Neuro: A+O x 3, non-focal, speech clear and intact  HEENT:  NCAT, No conjuctival edema or icterus, no thrush.    Neck:  Supple, trachea midline  Pulm: +Diminished BSs at bases bilaterally, no accessory muscle use noted  CV: regular rate, regular rhythm, +S1S2, no murmur noted  Abd: Soft, ND, NT, + BS  Ext: HECK x 4, no edema, no cyanosis, distal motor/neuro/circ intact  Skin: warm, dry, perfused  Incisions: midsternal mepilex C/D/I, sternum stable, B/l LE vein harvest sites C/D/I w/ dressing    LABS                        10.5   12.51 )-----------( 189      ( 25 Mar 2023 05:45 )             31.6     03-25    139  |  102  |  20.5<H>  ----------------------------<  113<H>  4.0   |  25.0  |  0.99    Ca    8.4      25 Mar 2023 05:45  Mg     2.2     03-25    POCT Blood Glucose.: 184 mg/dL (03-25-23 @ 21:09)  POCT Blood Glucose.: 126 mg/dL (03-25-23 @ 17:13)  POCT Blood Glucose.: 161 mg/dL (03-25-23 @ 12:12)  POCT Blood Glucose.: 176 mg/dL (03-25-23 @ 08:20)  POCT Blood Glucose.: 115 mg/dL (03-25-23 @ 06:28)      Last CXR:  < from: Xray Chest 1 View- PORTABLE-Urgent (Xray Chest 1 View- PORTABLE-Urgent .) (03.25.23 @ 14:10) >  FINDINGS: The patient is status post sternotomy.  A left chest tube was removed.  The heart is unchanged in size.  The lungs are clear.  There are no pleural effusions.  There is no pneumothorax.  IMPRESSION:  Status post left chest tube removal. No pneumothorax  < end of copied text >  
  Subjective: reports feeling bloated, +BM, denies CP, palpitations, SOB, cough, fever, chills, itchiness/rash, diaphoresis, vision changes, HA, dizziness/lightheadedness, numbness/tingling, abd pain, N/V     T(C): 37.3 (03-27-23 @ 00:27), Max: 37.3 (03-27-23 @ 00:27)  HR: 100 (03-27-23 @ 00:27) (84 - 100)  BP: 132/70 (03-27-23 @ 00:27) (128/72 - 149/77)  RR: 18 (03-27-23 @ 00:27) (16 - 18)  SpO2: 93% (03-27-23 @ 00:27) (91% - 97%)    03-26    141  |  103  |  28.5<H>  ----------------------------<  150<H>  4.0   |  27.0  |  1.15    Ca    7.9<L>      26 Mar 2023 06:05  Mg     2.0     03-26                                 9.8    6.95  )-----------( 186      ( 26 Mar 2023 06:05 )             29.6                    CAPILLARY BLOOD GLUCOSE  POCT Blood Glucose.: 142 mg/dL (26 Mar 2023 21:14)  POCT Blood Glucose.: 156 mg/dL (26 Mar 2023 17:02)  POCT Blood Glucose.: 174 mg/dL (26 Mar 2023 11:58)  POCT Blood Glucose.: 196 mg/dL (26 Mar 2023 08:11)    I&O's Detail    25 Mar 2023 07:01  -  26 Mar 2023 07:00  --------------------------------------------------------  IN:    Oral Fluid: 120 mL  Total IN: 120 mL    OUT:    Voided (mL): 500 mL  Total OUT: 500 mL  Total NET: -380 mL    26 Mar 2023 07:01  -  27 Mar 2023 03:01  --------------------------------------------------------  IN:  Total IN: 0 mL    OUT:    Voided (mL): 800 mL  Total OUT: 800 mL  Total NET: -800 mL    Drug Dosing Weight  Height (cm): 165.1 (21 Mar 2023 07:17)  Weight (kg): 71.2 (21 Mar 2023 07:17)  BMI (kg/m2): 26.1 (21 Mar 2023 07:17)  BSA (m2): 1.78 (21 Mar 2023 07:17)    MEDICATIONS  (STANDING):  aspirin enteric coated 81 milliGRAM(s) Oral daily  atorvastatin 40 milliGRAM(s) Oral at bedtime  enoxaparin Injectable 40 milliGRAM(s) SubCutaneous every 24 hours  furosemide    Tablet 20 milliGRAM(s) Oral daily  glucagon  Injectable 1 milliGRAM(s) IntraMuscular once  insulin lispro (ADMELOG) corrective regimen sliding scale   SubCutaneous Before meals and at bedtime  linagliptin 5 milliGRAM(s) Oral daily  metFORMIN 500 milliGRAM(s) Oral three times a day  metoprolol tartrate 100 milliGRAM(s) Oral two times a day  pantoprazole    Tablet 40 milliGRAM(s) Oral daily  polyethylene glycol 3350 17 Gram(s) Oral daily  potassium chloride    Tablet ER 20 milliEquivalent(s) Oral daily  senna 2 Tablet(s) Oral at bedtime  tamsulosin 0.4 milliGRAM(s) Oral at bedtime    MEDICATIONS  (PRN):  acetaminophen     Tablet .. 650 milliGRAM(s) Oral every 6 hours PRN Mild Pain (1 - 3)  oxyCODONE    IR 5 milliGRAM(s) Oral every 6 hours PRN Moderate Pain (4 - 6)  oxyCODONE    IR 10 milliGRAM(s) Oral every 6 hours PRN Severe Pain (7 - 10)    Physical Exam  Gen: NAD  Neuro: A&Ox3 non focal speech clear and intact  Pulm: decreased BS b/l no wheezing  CV: S1S2 RRR no murmurs  Abd: +BS soft NT, mild dist  Extrem/MS: no edema/cyanosis,   Incision(s): mid sternal inc C/D/I, staples, sternum stable no click, LLE EVH site C/D/I    
INTERVAL EVENTS:  Follow up diabetes management    ROS: Denies chest pain, sob, abd pain.     MEDICATIONS  (STANDING):  aspirin enteric coated 81 milliGRAM(s) Oral daily  atorvastatin 40 milliGRAM(s) Oral at bedtime  bisacodyl Suppository 10 milliGRAM(s) Rectal once  enoxaparin Injectable 40 milliGRAM(s) SubCutaneous every 24 hours  furosemide    Tablet 20 milliGRAM(s) Oral daily  glucagon  Injectable 1 milliGRAM(s) IntraMuscular once  insulin glargine Injectable (LANTUS) 10 Unit(s) SubCutaneous at bedtime  insulin lispro (ADMELOG) corrective regimen sliding scale   SubCutaneous Before meals and at bedtime  linagliptin 5 milliGRAM(s) Oral daily  metFORMIN 500 milliGRAM(s) Oral two times a day with meals  metoprolol tartrate 100 milliGRAM(s) Oral two times a day  pantoprazole    Tablet 40 milliGRAM(s) Oral daily  polyethylene glycol 3350 17 Gram(s) Oral daily  potassium chloride    Tablet ER 20 milliEquivalent(s) Oral daily  senna 2 Tablet(s) Oral at bedtime  tamsulosin 0.4 milliGRAM(s) Oral at bedtime    MEDICATIONS  (PRN):  acetaminophen     Tablet .. 650 milliGRAM(s) Oral every 6 hours PRN Mild Pain (1 - 3)  oxyCODONE    IR 5 milliGRAM(s) Oral every 6 hours PRN Moderate Pain (4 - 6)  oxyCODONE    IR 10 milliGRAM(s) Oral every 6 hours PRN Severe Pain (7 - 10)    Allergies  chocolate (Rash)  No Known Drug Allergies    Vital Signs Last 24 Hrs  T(C): 36.7 (24 Mar 2023 10:35), Max: 37 (23 Mar 2023 16:00)  T(F): 98 (24 Mar 2023 10:35), Max: 98.6 (23 Mar 2023 16:00)  HR: 107 (24 Mar 2023 12:51) (98 - 115)  BP: 150/90 (24 Mar 2023 12:51) (101/57 - 181/87)  BP(mean): 112 (23 Mar 2023 20:00) (91 - 124)  RR: 16 (24 Mar 2023 12:51) (16 - 25)  SpO2: 100% (24 Mar 2023 12:51) (92% - 100%)    Parameters below as of 24 Mar 2023 12:51  Patient On (Oxygen Delivery Method): room air      PHYSICAL EXAM:  General: No apparent distress  Neck: Supple, trachea midline, no thyromegaly  Respiratory: Lungs clear bilaterally, normal rate, effort  Cardiac: +S1, S2, no m/r/g  GI: +BS, soft, non tender, non distended  Extremities: No peripheral edema, no pedal lesions  Neuro: A+O X3, no tremor  Pysch: Affect appropriate   Skin: No acanthosis       LABS:                        10.4   12.68 )-----------( 165      ( 24 Mar 2023 05:10 )             30.1     03-24    140  |  104  |  19.5  ----------------------------<  115<H>  4.1   |  24.0  |  0.98    Ca    8.0<L>      24 Mar 2023 05:10  Mg     2.1     03-24    TPro  6.5<L>  /  Alb  3.8  /  TBili  0.6  /  DBili  0.2  /  AST  23  /  ALT  12  /  AlkPhos  44  03-23    POCT Blood Glucose.: 156 mg/dL (03-24-23 @ 12:06)  POCT Blood Glucose.: 135 mg/dL (03-24-23 @ 08:12)  POCT Blood Glucose.: 148 mg/dL (03-23-23 @ 21:17)  POCT Blood Glucose.: 163 mg/dL (03-23-23 @ 17:50)    Thyroid Stimulating Hormone, Serum: 0.90 uIU/mL (03-20-23 @ 19:10)  Free Thyroxine, Serum: 1.0 ng/dL (03-20-23 @ 19:10)  
INTERVAL EVENTS:  Follow up diabetes management    ROS: Endorses incisional pain. Denies sob, abd pain.    MEDICATIONS  (STANDING):  aspirin enteric coated 81 milliGRAM(s) Oral daily  atorvastatin 40 milliGRAM(s) Oral at bedtime  chlorhexidine 2% Cloths 1 Application(s) Topical <User Schedule>  dextrose 5%. 1000 milliLiter(s) (50 mL/Hr) IV Continuous <Continuous>  dextrose 5%. 1000 milliLiter(s) (100 mL/Hr) IV Continuous <Continuous>  dextrose 50% Injectable 50 milliLiter(s) IV Push every 15 minutes  dextrose 50% Injectable 25 milliLiter(s) IV Push every 15 minutes  enoxaparin Injectable 40 milliGRAM(s) SubCutaneous every 24 hours  glucagon  Injectable 1 milliGRAM(s) IntraMuscular once  insulin glargine Injectable (LANTUS) 18 Unit(s) SubCutaneous at bedtime  insulin lispro (ADMELOG) corrective regimen sliding scale   SubCutaneous Before meals and at bedtime  insulin lispro Injectable (ADMELOG) 4 Unit(s) SubCutaneous three times a day before meals  metoclopramide Injectable 10 milliGRAM(s) IV Push every 8 hours  metoprolol tartrate 25 milliGRAM(s) Oral two times a day  pantoprazole    Tablet 40 milliGRAM(s) Oral daily  polyethylene glycol 3350 17 Gram(s) Oral daily  senna 2 Tablet(s) Oral at bedtime  sodium chloride 0.9%. 1000 milliLiter(s) (10 mL/Hr) IV Continuous <Continuous>  tamsulosin 0.4 milliGRAM(s) Oral at bedtime  vancomycin  IVPB 750 milliGRAM(s) IV Intermittent <User Schedule>    MEDICATIONS  (PRN):  acetaminophen     Tablet .. 650 milliGRAM(s) Oral every 6 hours PRN Mild Pain (1 - 3)  dextrose Oral Gel 15 Gram(s) Oral once PRN Blood Glucose LESS THAN 70 milliGRAM(s)/deciliter  oxyCODONE    IR 5 milliGRAM(s) Oral every 6 hours PRN Moderate Pain (4 - 6)  oxyCODONE    IR 10 milliGRAM(s) Oral every 6 hours PRN Severe Pain (7 - 10)    Allergies  chocolate (Rash)  No Known Drug Allergies    Vital Signs Last 24 Hrs  T(C): 37.1 (23 Mar 2023 12:00), Max: 37.4 (22 Mar 2023 16:00)  T(F): 98.7 (23 Mar 2023 12:00), Max: 99.3 (22 Mar 2023 16:00)  HR: 103 (23 Mar 2023 11:00) (85 - 106)  BP: 154/74 (23 Mar 2023 11:00) (110/63 - 162/81)  BP(mean): 106 (23 Mar 2023 11:00) (77 - 114)  RR: 19 (23 Mar 2023 11:00) (15 - 27)  SpO2: 95% (23 Mar 2023 11:00) (95% - 100%)    Parameters below as of 23 Mar 2023 08:00  Patient On (Oxygen Delivery Method): room air      PHYSICAL EXAM:  General: No apparent distress  Neck: Supple, trachea midline, no thyromegaly  Respiratory: Lungs clear bilaterally, normal rate, effort  Cardiac: +S1, S2, no m/r/g  GI: +BS, soft, non tender, non distended  Extremities: No peripheral edema, no pedal lesions  Neuro: A+O X3, no tremor  Pysch: Affect appropriate   Skin: No acanthosis       LABS:                        10.4   14.53 )-----------( 174      ( 23 Mar 2023 10:23 )             29.6     03-23    138  |  103  |  21.7<H>  ----------------------------<  172<H>  4.1   |  23.0  |  1.19    Ca    8.4      23 Mar 2023 10:23  Mg     2.4     03-23    TPro  5.9<L>  /  Alb  3.5  /  TBili  1.1  /  DBili  x   /  AST  25  /  ALT  12  /  AlkPhos  43  03-21    POCT Blood Glucose.: 202 mg/dL (03-23-23 @ 11:45)  POCT Blood Glucose.: 163 mg/dL (03-23-23 @ 08:17)  POCT Blood Glucose.: 117 mg/dL (03-22-23 @ 23:09)  POCT Blood Glucose.: 142 mg/dL (03-22-23 @ 22:03)  POCT Blood Glucose.: 197 mg/dL (03-22-23 @ 20:49)  POCT Blood Glucose.: 210 mg/dL (03-22-23 @ 19:49)  POCT Blood Glucose.: 216 mg/dL (03-22-23 @ 18:47)    Thyroid Stimulating Hormone, Serum: 0.90 uIU/mL (03-20-23 @ 19:10)  Free Thyroxine, Serum: 1.0 ng/dL (03-20-23 @ 19:10)  
RACHID BALDWIN   MRN#: [id number]     The patient is a 77y Male who was seen, evaluated, & examined with the CTICU staff on rounds and later in the day with a multidisciplinary care plan formulated & implemented.  All available clinical, laboratory, radiographic, pharmacologic, electrocardiographic, and intraoperative  data were reviewed & analyzed.      The patient was in the CTICU in critical condition at risk for imminent decompensation secondary to persistent cardiopulmonary dysfunction, hemodynamically significant hypovolemia, and stress hyperglycemia.      Respiratory status required full ventilatory support, the following of ABG’s with A-line monitoring, continuous pulse oximetry monitoring, and continuous ETCO2 monitoring for support & to evaluate for & prevent further decompensation secondary to persistent cardiopulmonary dysfunction.       Invasive hemodynamic monitoring with a PA catheter & an A-line were required for the following of serial CI’s/MVO2’s & continuous MAP/BP monitoring to ensure adequate cardiovascular support and to evaluate for & help prevent decompensation while receiving intermittent volume expansion and an IV Levophed drip secondary to hemodynamically significant hypovolemia-shock.    Metabolic stability, stress hyperglycemia, & infection prophylaxis required Insulin & the following of serial glucose levels to help achieve & maintain euglycemia.  Based upon my evaluation and review I maintained the current metabolic therapy.    Patient required critical care management and is at risk for life threatening decompensation  I provided 35 minutes of non-continuous care to the patient.  
Interval Events:  no overnight events  follow up on     patient seen and examined at bedside.  FS reviewed  labs and imaging independently reviewed  feels well   no complaints  eating and drinking well    ROS as noted above    chocolate (Rash)  No Known Drug Allergies      MEDICATIONS  (STANDING):  aspirin enteric coated 81 milliGRAM(s) Oral daily  atorvastatin 40 milliGRAM(s) Oral at bedtime  enoxaparin Injectable 40 milliGRAM(s) SubCutaneous every 24 hours  furosemide    Tablet 20 milliGRAM(s) Oral daily  glucagon  Injectable 1 milliGRAM(s) IntraMuscular once  insulin lispro (ADMELOG) corrective regimen sliding scale   SubCutaneous Before meals and at bedtime  linagliptin 5 milliGRAM(s) Oral daily  metFORMIN 500 milliGRAM(s) Oral three times a day  metoprolol tartrate 100 milliGRAM(s) Oral two times a day  pantoprazole    Tablet 40 milliGRAM(s) Oral daily  polyethylene glycol 3350 17 Gram(s) Oral daily  potassium chloride    Tablet ER 20 milliEquivalent(s) Oral daily  senna 2 Tablet(s) Oral at bedtime  tamsulosin 0.4 milliGRAM(s) Oral at bedtime    MEDICATIONS  (PRN):  acetaminophen     Tablet .. 650 milliGRAM(s) Oral every 6 hours PRN Mild Pain (1 - 3)  oxyCODONE    IR 5 milliGRAM(s) Oral every 6 hours PRN Moderate Pain (4 - 6)  oxyCODONE    IR 10 milliGRAM(s) Oral every 6 hours PRN Severe Pain (7 - 10)      Vital Signs Last 24 Hrs  T(C): 36.8 (26 Mar 2023 07:33), Max: 36.8 (26 Mar 2023 00:32)  T(F): 98.2 (26 Mar 2023 07:33), Max: 98.2 (26 Mar 2023 00:32)  HR: 93 (26 Mar 2023 07:33) (83 - 99)  BP: 128/72 (26 Mar 2023 07:33) (109/66 - 149/77)  BP(mean): --  RR: 16 (26 Mar 2023 07:33) (16 - 18)  SpO2: 97% (26 Mar 2023 07:33) (94% - 97%)    Parameters below as of 26 Mar 2023 07:33  Patient On (Oxygen Delivery Method): room air      Weight (kg): 71.2 (03-21-23 @ 07:17)    Physical Exam:    Constitutional: NAD, elderly  Respiratory: CTAB, normal respirations  Cardiovascular: S1 and S2, RRR, bandaged anterior chest  Gastrointestinal: BS+, soft, ntnd  Neurological: AOx3, no focal deficits  Psychiatric: Normal mood and normal affect  Skin: no rashes, no acanthosis    LABS  03-26    141  |  103  |  28.5<H>  ----------------------------<  150<H>  4.0   |  27.0  |  1.15    Ca    7.9<L>      26 Mar 2023 06:05  Mg     2.0     03-26                            9.8    6.95  )-----------( 186      ( 26 Mar 2023 06:05 )             29.6     Triglycerides, Serum: 129 mg/dL (03-20-23 @ 19:10)  Cholesterol, Serum: 124 mg/dL (03-20-23 @ 19:10)  HDL Cholesterol, Serum: 43 mg/dL (03-20-23 @ 19:10)    Thyroid Stimulating Hormone, Serum: 0.90 uIU/mL (03-20-23 @ 19:10)  Free Thyroxine, Serum: 1.0 ng/dL (03-20-23 @ 19:10)  A1C with Estimated Average Glucose Result: 6.9 % (03-20-23 @ 19:10)            CAPILLARY BLOOD GLUCOSE      POCT Blood Glucose.: 174 mg/dL (26 Mar 2023 11:58)  POCT Blood Glucose.: 196 mg/dL (26 Mar 2023 08:11)  POCT Blood Glucose.: 184 mg/dL (25 Mar 2023 21:09)  POCT Blood Glucose.: 126 mg/dL (25 Mar 2023 17:13)  
  RACHID BALDWIN  MRN#: [id number]  Subjective: The patient was in the CTICU in critical condition at risk for imminent decompensation and was seen and evaluate on AM rounds with the entire multidisciplinary team.     OBJECTIVE:  ICU Vital Signs Last 24 Hrs  T(C): 37.4 (22 Mar 2023 08:00), Max: 43 (21 Mar 2023 13:08)  T(F): 99.3 (22 Mar 2023 08:00), Max: 109.4 (21 Mar 2023 13:08)  HR: 91 (22 Mar 2023 10:00) (73 - 95)  BP: 134/66 (22 Mar 2023 10:00) (105/59 - 162/72)  BP(mean): 93 (22 Mar 2023 10:00) (75 - 104)  ABP: 152/54 (22 Mar 2023 10:00) (104/45 - 165/67)  ABP(mean): 83 (22 Mar 2023 10:00) (62 - 99)  RR: 24 (22 Mar 2023 10:00) (10 - 24)  SpO2: 98% (22 Mar 2023 10:00) (94% - 100%)    O2 Parameters below as of 22 Mar 2023 08:00  Patient On (Oxygen Delivery Method): nasal cannula  O2 Flow (L/min): 2          03-21 @ 07:01 - 03-22 @ 07:00  --------------------------------------------------------  IN: 2058.5 mL / OUT: 3105 mL / NET: -1046.5 mL    03-22 @ 07:01  -  03-22 @ 10:26  --------------------------------------------------------  IN: 586 mL / OUT: 295 mL / NET: 291 mL        PHYSICAL EXAM:Daily     Daily   General: WN/WD NAD    HEENT:     + NCAT  + EOMI  - Conjuctival edema   - Icterus   - Thrush   - ETT  - NGT/OGT  Neck:         + FROM    + JVD     - Nodes     - Masses    + Mid-line trachea   - Tracheostomy  Chest:         - Sternal click  - Sternal drainage  + Pacing wires  + Chest tubes  - SubQ emphysema  Lungs:          + CTA   - Rhonchi    - Rales    - Wheezing     - Decreased BS   - Dullness R L  Cardiac:       + S1 + S2    + RRR   - Irregular   - S3  - S4    - Murmurs   - Rub   - Hamman’s sign   Abdomen:    + BS     + Soft    + Non-tender     - Distended    - Organomegaly  - PEG  Extremities:   - Cyanosis U/L   - Clubbing  U/L  - LE/UE Edema   + Capillary refill    + Pulses   Neuro:        + Awake   +  Alert   - Confused   - Lethargic   - Sedated   - Generalized Weakness  Skin:        - Rashes    - Erythema   + Normal incisions   + IV sites intact  - Sacral decubitus    HOSPITAL MEDICATIONS: All mediciations reviewed and analyzed  MEDICATIONS  (STANDING):  aspirin enteric coated 81 milliGRAM(s) Oral daily  atorvastatin 40 milliGRAM(s) Oral at bedtime  cefuroxime  IVPB 1500 milliGRAM(s) IV Intermittent every 8 hours  enoxaparin Injectable 40 milliGRAM(s) SubCutaneous every 24 hours  glucagon  Injectable 1 milliGRAM(s) IntraMuscular once  HYDROmorphone  Injectable 0.25 milliGRAM(s) IV Push once  insulin lispro Injectable (ADMELOG) 2 Unit(s) SubCutaneous three times a day before meals  insulin regular Infusion 2 Unit(s)/Hr (2 mL/Hr) IV Continuous <Continuous>  metoprolol tartrate 25 milliGRAM(s) Oral two times a day  niCARdipine Infusion 5 mG/Hr (25 mL/Hr) IV Continuous <Continuous>  pantoprazole    Tablet 40 milliGRAM(s) Oral daily  polyethylene glycol 3350 17 Gram(s) Oral daily  senna 2 Tablet(s) Oral at bedtime  sodium chloride 0.9%. 1000 milliLiter(s) (10 mL/Hr) IV Continuous <Continuous>  tamsulosin 0.4 milliGRAM(s) Oral at bedtime  vancomycin  IVPB 750 milliGRAM(s) IV Intermittent <User Schedule>    MEDICATIONS  (PRN):  dextrose Oral Gel 15 Gram(s) Oral once PRN Blood Glucose LESS THAN 70 milliGRAM(s)/deciliter    LABS: All Lab data reviewed and analyzed                        9.1    13.78 )-----------( 180      ( 22 Mar 2023 02:10 )             26.3    03-22    142  |  111<H>  |  13.3  ----------------------------<  98  4.6   |  21.0<L>  |  1.21    Ca    8.0<L>      22 Mar 2023 02:10  Mg     1.9     03-22    TPro  5.9<L>  /  Alb  3.5  /  TBili  1.1  /  DBili  x   /  AST  25  /  ALT  12  /  AlkPhos  43  03-21    PT/INR - ( 21 Mar 2023 20:15 )   PT: 15.6 sec;   INR: 1.34 ratio         PTT - ( 21 Mar 2023 13:15 )  PTT:35.0 sec   ABG - ( 22 Mar 2023 02:14 )  pH, Arterial: 7.380 pH, Blood: x     /  pCO2: 38    /  pO2: 103   / HCO3: 22    / Base Excess: -2.6  /  SaO2: 99.5      RADIOLOGY: - Reviewed and analyzed     Assessment: Respiratory insufficieny, hypovolemia, and stress hyperglycemia    Plan:   - Respiratory status required supplemental oxygen and the following of continuous pulse oximetry for support & to prevent decompensation  - Continued early mobilization as tolerated  - Patient requires deresuscitation for perioperative fluid administarion and increase weight  - Addressed analgesic regimen to optimize function  - ASA continued for graft occlusion-thromboembolism prophylaxis  - Lipitor for long term graft patency  - Lovenox continued for VTE prophylaxis in addition to Venodyne boots  - Protonix maintained for GI bleeding prophylaxis  - Lopressor continued for atrial fibrillation prophylaxis  - Metabolic stability & infection prophylaxis required review and adjustment of regular Insulin drip and gylcemic regimen while following serial glucose levels to help achieve & maintain euglycemia  - Reviewed & addressed surgical site infection prophylaxis regimen    Patient required critical care management and is at risk for life threatening decompensation  I provided 40 minutes of non-continuous care to the patient.  
RACHID BALDWIN  MRN-[id number]    HPI:  Pt is a 76yo Male with PMHx of HTN, HLD, BPH, and NIDDM2 who was seen by his Cardiologist outpatient for chest pain work-up. Pt states that he has been experiencing chest pain with minimal exertion rated 1/10, dull, radiating down b/l arms, relieved by rest. Denied any other associated symptoms. Patient states he has otherwise been in good health. Pt is s/p elective LHC/RHC at [facility] 3/20 showing MVD. Pt transferred to [facility] for CABG evaluation. Pt arrived to [facility] via EMS transport in no apparent distress. Pt currently denies CP, SOB at rest, orthopnea, palpitations, HA/dizziness, numbness/tingling in extremities, abdominal pain, N/V/D/C, or any other acute complaints. (20 Mar 2023 18:01)      Surgery/Hospital Course:  ·  PRE-OP DIAGNOSIS:  CAD (coronary artery disease)  ·  POST-OP DIAGNOSIS:  CAD (coronary artery disease)   ·  PROCEDURES:  CABG, with MARIA VICTORIA 21-Mar-2023   LIMA-LAD , SVG-OM, SVG- PDA   endoscopic vein harvest of bilateral greater sapnehous vein   Today:  No acute events   Xfuse 1 U PRBC   DC SS chest tube  SR, RA        ICU Vital Signs Last 24 Hrs  T(C): 37.1 (23 Mar 2023 12:00), Max: 37.4 (22 Mar 2023 16:00)  T(F): 98.7 (23 Mar 2023 12:00), Max: 99.3 (22 Mar 2023 16:00)  HR: 103 (23 Mar 2023 11:00) (85 - 106)  BP: 154/74 (23 Mar 2023 11:00) (95/63 - 162/81)  BP(mean): 106 (23 Mar 2023 11:00) (75 - 114)  ABP: 141/67 (23 Mar 2023 11:00) (78/55 - 161/53)  ABP(mean): 94 (23 Mar 2023 11:00) (67 - 98)  RR: 19 (23 Mar 2023 11:00) (15 - 35)  SpO2: 95% (23 Mar 2023 11:00) (91% - 100%)    O2 Parameters below as of 23 Mar 2023 08:00  Patient On (Oxygen Delivery Method): room air            Physical Exam:  Gen: A&O   CNS: non focal 	  Neck: no JVD  RES : clear , no wheezing              CVS: Regular  rhythm. Normal S1/S2  Abd: Soft, non-distended. Bowel sounds present.  Skin: No rash.  Ext:  no edema    ============================I/O===========================   I&O's Detail    22 Mar 2023 07:01  -  23 Mar 2023 07:00  --------------------------------------------------------  IN:    Albumin 5%  - 250 mL: 250 mL    Insulin: 30 mL    IV PiggyBack: 150 mL    IV PiggyBack: 100 mL    IV PiggyBack: 500 mL    PRBCs (Packed Red Blood Cells): 350 mL    sodium chloride 0.9%: 240 mL  Total IN: 1620 mL    OUT:    Blood Loss (mL): 0 mL    Chest Tube (mL): 20 mL    Chest Tube (mL): 140 mL    Chest Tube (mL): 300 mL    Indwelling Catheter - Urethral (mL): 1280 mL    NiCARdipine: 0 mL  Total OUT: 1740 mL    Total NET: -120 mL      23 Mar 2023 07:01  -  23 Mar 2023 12:21  --------------------------------------------------------  IN:    sodium chloride 0.9%: 40 mL  Total IN: 40 mL    OUT:    Chest Tube (mL): 10 mL    Chest Tube (mL): 10 mL    Chest Tube (mL): 10 mL    Indwelling Catheter - Urethral (mL): 1800 mL    Insulin: 0 mL  Total OUT: 1830 mL    Total NET: -1790 mL        ============================ LABS =========================                        10.4   14.53 )-----------( 174      ( 23 Mar 2023 10:23 )             29.6     03-23    138  |  103  |  21.7<H>  ----------------------------<  172<H>  4.1   |  23.0  |  1.19    Ca    8.4      23 Mar 2023 10:23  Mg     2.4     03-23    TPro  5.9<L>  /  Alb  3.5  /  TBili  1.1  /  DBili  x   /  AST  25  /  ALT  12  /  AlkPhos  43  03-21    LIVER FUNCTIONS - ( 21 Mar 2023 13:15 )  Alb: 3.5 g/dL / Pro: 5.9 g/dL / ALK PHOS: 43 U/L / ALT: 12 U/L / AST: 25 U/L / GGT: x           PT/INR - ( 21 Mar 2023 20:15 )   PT: 15.6 sec;   INR: 1.34 ratio         PTT - ( 21 Mar 2023 13:15 )  PTT:35.0 sec  ABG - ( 22 Mar 2023 02:14 )  pH, Arterial: 7.380 pH, Blood: x     /  pCO2: 38    /  pO2: 103   / HCO3: 22    / Base Excess: -2.6  /  SaO2: 99.5                ======================Micro/Rad/Cardio=================  Culture: Reviewed   CXR: Reviewed  Echo:Reviewed  ======================================================  PAST MEDICAL & SURGICAL HISTORY:  HTN (hypertension)      HLD (hyperlipidemia)      BPH without obstruction/lower urinary tract symptoms      DM2 (diabetes mellitus, type 2)      S/P bilateral inguinal hernia repair      S/P appendectomy        ====================ASSESSMENT ==============  Pt is a 76yo Male with PMHx of HTN, HLD, BPH, and NIDDM2 who was seen by his Cardiologist outpatient for chest pain work-up. Pt states that he has been experiencing chest pain with minimal exertion rated 1/10, dull, radiating down b/l arms, relieved by rest. Denied any other associated symptoms. Patient states he has otherwise been in good health. Pt is s/p elective LHC/RHC at [facility] 3/20 showing MVD. Pt transferred to [facility] for CABG evaluation. 3/21 underwent CABG x3 with Dr Goel.  He was exubated POD 0.      ---CAD (coronary artery disease).   --- POD 2 S/P cabg x3  --- DM2 (diabetes mellitus, type 2).   --- Chronic kidney disease (CKD).   ---HTN (hypertension).   --- HLD (hyperlipidemia).   ---Post op Hypovolemia  ---Post op respiratory insufficiency       Plan:  -continue Beta blocker as tolerated by HR and SBP  -Lipitor for chronic graft patency.  -Aspirin for acute graft closure  -Chest PT and IS use with bedside nurse  -D/C substernal and left tube later today  -Endocrine consult appreciated   -DVT ppx with SCD boots and lovenox   -GI prophylaxis with protonix.-    ====================== NEUROLOGY=====================  acetaminophen     Tablet .. 650 milliGRAM(s) Oral every 6 hours PRN Mild Pain (1 - 3)  metoclopramide Injectable 10 milliGRAM(s) IV Push every 8 hours  oxyCODONE    IR 5 milliGRAM(s) Oral every 6 hours PRN Moderate Pain (4 - 6)  oxyCODONE    IR 10 milliGRAM(s) Oral every 6 hours PRN Severe Pain (7 - 10)    ==================== RESPIRATORY======================  Post op respiratory insufficiency    ====================CARDIOVASCULAR==================  Post op Hypovolemia  metoprolol tartrate 25 milliGRAM(s) Oral two times a day    ===================HEMATOLOGIC/ONC ===================  Monitor H&H/Plts    aspirin enteric coated 81 milliGRAM(s) Oral daily  enoxaparin Injectable 40 milliGRAM(s) SubCutaneous every 24 hours    ===================== RENAL =========================  Continue monitoring urine output, I&OS, BUN/Cr   tamsulosin 0.4 milliGRAM(s) Oral at bedtime    ==================== GASTROINTESTINAL===================  dextrose 5%. 1000 milliLiter(s) (50 mL/Hr) IV Continuous <Continuous>  dextrose 5%. 1000 milliLiter(s) (100 mL/Hr) IV Continuous <Continuous>  pantoprazole    Tablet 40 milliGRAM(s) Oral daily  polyethylene glycol 3350 17 Gram(s) Oral daily  senna 2 Tablet(s) Oral at bedtime  sodium chloride 0.9%. 1000 milliLiter(s) (10 mL/Hr) IV Continuous <Continuous>    =======================    ENDOCRINE  =====================  atorvastatin 40 milliGRAM(s) Oral at bedtime  dextrose 50% Injectable 50 milliLiter(s) IV Push every 15 minutes  dextrose 50% Injectable 25 milliLiter(s) IV Push every 15 minutes  dextrose Oral Gel 15 Gram(s) Oral once PRN Blood Glucose LESS THAN 70 milliGRAM(s)/deciliter  glucagon  Injectable 1 milliGRAM(s) IntraMuscular once  insulin glargine Injectable (LANTUS) 18 Unit(s) SubCutaneous at bedtime  insulin lispro (ADMELOG) corrective regimen sliding scale   SubCutaneous Before meals and at bedtime  insulin lispro Injectable (ADMELOG) 4 Unit(s) SubCutaneous three times a day before meals    ========================INFECTIOUS DISEASE================  vancomycin  IVPB 750 milliGRAM(s) IV Intermittent <User Schedule>      -Monitor Neurologic status ,   -Head of the bed should remain elevated to 45 degrees,  -Monitor for arrhythmias and monitor parameters for organ perfusion,  -Glycemic control is satisfactory,  -Nutritional goals will be met using po eventually , insure adequate caloric intake and monitor the same ,  -Electrolytes have been repleted as necessary , pain control has been achieved  and wound care has been carried out ,  -Stress ulcer and VTE prophylaxis will be achieved,  -Agressive PT and early mobility and ambulation goals will be met,      I have spent 35 minutes providing acute care for this critically ill patient     Patient requires continuous monitoring with bedside rhythm monitoring, pulse ox monitoring, and intermittent blood gas analysis. Care plan discussed with ICU care team. Patient remained critical and at risk for life threatening decompensation.           
Patient seen and examined.  Denies CP, SOB, N/V.  Speech and swallow evaluated today, he is now on thickened liquid diet with solid foods.      T(C): 37.2 (03-23-23 @ 01:00)  T(F): 99 (03-23-23 @ 01:00)  HR: 86 (03-23-23 @ 01:00)  BP: 110/63 (03-23-23 @ 00:00)  BP(mean): 79 (03-23-23 @ 00:00)  ABP: 113/45 (03-23-23 @ 01:00)  ABP(mean): 68 (03-23-23 @ 01:00)  RR: 15 (03-23-23 @ 01:00)  SpO2: 99% (03-23-23 @ 01:00)  Wt(kg): --  CVP(mm Hg): 10 (03-23-23 @ 01:00)  CI: 2.2 (03-22-23 @ 04:00)  PA: 32/12 (03-22-23 @ 04:45)  PA(mean): 21 (03-22-23 @ 04:45)  PA(direct): --  SVRI: 2324 (03-22-23 @ 04:00)      Physical Exam:  Gen: A&Ox3  Pulm:  CTA b/l, no r/r/w  CV:  S1S2, RRR, no m/r/g  Abd: +BS, soft, NT, ND  Ext:  +DP b/l, no c/c/e  Incision:  c/d/i no click, no exudate    I&O's Detail    21 Mar 2023 07:01  -  22 Mar 2023 07:00  --------------------------------------------------------  IN:    Albumin 5%  - 250 mL: 250 mL    Insulin: 44.5 mL    IV PiggyBack: 50 mL    IV PiggyBack: 200 mL    IV PiggyBack: 250 mL    IV PiggyBack: 350 mL    IV PiggyBack: 100 mL    Lactated Ringers Bolus: 500 mL    NiCARdipine: 120 mL    Norepinephrine: 4 mL    sodium chloride 0.9%: 190 mL  Total IN: 2058.5 mL    OUT:    Chest Tube (mL): 310 mL    Chest Tube (mL): 260 mL    Chest Tube (mL): 290 mL    Indwelling Catheter - Urethral (mL): 735 mL    Voided (mL): 1510 mL  Total OUT: 3105 mL    Total NET: -1046.5 mL      22 Mar 2023 07:01  -  23 Mar 2023 01:28  --------------------------------------------------------  IN:    Albumin 5%  - 250 mL: 250 mL    Insulin: 30 mL    IV PiggyBack: 100 mL    IV PiggyBack: 100 mL    IV PiggyBack: 500 mL    sodium chloride 0.9%: 180 mL  Total IN: 1160 mL    OUT:    Chest Tube (mL): 20 mL    Chest Tube (mL): 100 mL    Chest Tube (mL): 260 mL no air leaks    Indwelling Catheter - Urethral (mL): 1015 mL    NiCARdipine: 0 mL  Total OUT: 1395 mL    Total NET: -235 mL                              9.1    13.78 )-----------( 180      ( 22 Mar 2023 02:10 )             26.3   03-22    142  |  111<H>  |  13.3  ----------------------------<  98  4.6   |  21.0<L>  |  1.21    Ca    8.0<L>      22 Mar 2023 02:10  Mg     1.9     03-22    TPro  5.9<L>  /  Alb  3.5  /  TBili  1.1  /  DBili  x   /  AST  25  /  ALT  12  /  AlkPhos  43  03-21  aPTT: x    ; PT: 15.6 sec; INR: 1.34 ratio  03-21-23 @ 20:15       ABG - ( 22 Mar 2023 02:14 )  pH: 7.380 /  pCO2: 38    /  pO2: 103   / HCO3: 22    / Base Excess: -2.6  /  SaO2: 99.5 /  Lactate: x        CAPILLARY BLOOD GLUCOSE      POCT Blood Glucose.: 117 mg/dL (22 Mar 2023 23:09)        Medications:  acetaminophen     Tablet .. 650 milliGRAM(s) Oral every 6 hours PRN  aspirin enteric coated 81 milliGRAM(s) Oral daily  atorvastatin 40 milliGRAM(s) Oral at bedtime  chlorhexidine 2% Cloths 1 Application(s) Topical <User Schedule>  dextrose 5%. 1000 milliLiter(s) IV Continuous <Continuous>  dextrose 5%. 1000 milliLiter(s) IV Continuous <Continuous>  dextrose 50% Injectable 50 milliLiter(s) IV Push every 15 minutes  dextrose 50% Injectable 25 milliLiter(s) IV Push every 15 minutes  dextrose Oral Gel 15 Gram(s) Oral once PRN  enoxaparin Injectable 40 milliGRAM(s) SubCutaneous every 24 hours  furosemide   Injectable 10 milliGRAM(s) IV Push daily  glucagon  Injectable 1 milliGRAM(s) IntraMuscular once  insulin glargine Injectable (LANTUS) 18 Unit(s) SubCutaneous at bedtime  insulin lispro (ADMELOG) corrective regimen sliding scale   SubCutaneous Before meals and at bedtime  insulin lispro Injectable (ADMELOG) 4 Unit(s) SubCutaneous three times a day before meals  insulin regular Infusion 2 Unit(s)/Hr IV Continuous <Continuous>  metoclopramide Injectable 10 milliGRAM(s) IV Push every 8 hours  metoprolol tartrate 25 milliGRAM(s) Oral two times a day  oxyCODONE    IR 5 milliGRAM(s) Oral every 6 hours PRN  oxyCODONE    IR 10 milliGRAM(s) Oral every 6 hours PRN  pantoprazole    Tablet 40 milliGRAM(s) Oral daily  polyethylene glycol 3350 17 Gram(s) Oral daily  senna 2 Tablet(s) Oral at bedtime  sodium chloride 0.9%. 1000 milliLiter(s) IV Continuous <Continuous>  tamsulosin 0.4 milliGRAM(s) Oral at bedtime  vancomycin  IVPB 750 milliGRAM(s) IV Intermittent <User Schedule>        
  Subjective: Pt sitting up in bed.  Denies CP or sob>  NAD noted.                          T(F): 98.1 (03-25-23 @ 16:25), Max: 98.1 (03-25-23 @ 16:25)  HR: 83 (03-25-23 @ 16:25) (82 - 97)  BP: 115/74 (03-25-23 @ 16:25) (115/74 - 138/77)  RR: 18 (03-25-23 @ 16:25) (18 - 18)  SpO2: 95% (03-25-23 @ 16:25) (95% - 97%)    LV EF: 50-55%    chocolate (Rash)  No Known Drug Allergies      03-25    139  |  102  |  20.5<H>  ----------------------------<  113<H>  4.0   |  25.0  |  0.99    Ca    8.4      25 Mar 2023 05:45  Mg     2.2     03-25    TPro  6.5<L>  /  Alb  3.8  /  TBili  0.6  /  DBili  0.2  /  AST  23  /  ALT  12  /  AlkPhos  44  03-23                               10.5   12.51 )-----------( 189      ( 25 Mar 2023 05:45 )             31.6               CAPILLARY BLOOD GLUCOSE  POCT Blood Glucose.: 126 mg/dL (25 Mar 2023 17:13)  POCT Blood Glucose.: 161 mg/dL (25 Mar 2023 12:12)  POCT Blood Glucose.: 176 mg/dL (25 Mar 2023 08:20)  POCT Blood Glucose.: 115 mg/dL (25 Mar 2023 06:28)  POCT Blood Glucose.: 98 mg/dL (24 Mar 2023 21:17)  POCT Blood Glucose.: 97 mg/dL (24 Mar 2023 20:56)           CXR: < from: Xray Chest 1 View- PORTABLE-Urgent (Xray Chest 1 View- PORTABLE-Urgent .) (03.25.23 @ 14:10) >    IMPRESSION:    Status post left chest tube removal. No pneumothorax    --- End of Report ---      ALEIDA GARRIDO MD; Attending Radiologist  This document has been electronically signed.  ALEIDA GARRIDO MD; Attending Radiologist  This document has been electronically signed. Mar 25 2023  5:06PM    < end of copied text >      I&O's Detail    24 Mar 2023 07:01  -  25 Mar 2023 07:00  --------------------------------------------------------  IN:  Total IN: 0 mL    OUT:    Chest Tube (mL): 0 mL    Indwelling Catheter - Urethral (mL): 400 mL  Total OUT: 400 mL    Total NET: -400 mL          Active Medications:  acetaminophen     Tablet .. 650 milliGRAM(s) Oral every 6 hours PRN  aspirin enteric coated 81 milliGRAM(s) Oral daily  atorvastatin 40 milliGRAM(s) Oral at bedtime  enoxaparin Injectable 40 milliGRAM(s) SubCutaneous every 24 hours  furosemide    Tablet 20 milliGRAM(s) Oral daily  glucagon  Injectable 1 milliGRAM(s) IntraMuscular once  insulin lispro (ADMELOG) corrective regimen sliding scale   SubCutaneous Before meals and at bedtime  linagliptin 5 milliGRAM(s) Oral daily  metoprolol tartrate 100 milliGRAM(s) Oral two times a day  oxyCODONE    IR 5 milliGRAM(s) Oral every 6 hours PRN  oxyCODONE    IR 10 milliGRAM(s) Oral every 6 hours PRN  pantoprazole    Tablet 40 milliGRAM(s) Oral daily  polyethylene glycol 3350 17 Gram(s) Oral daily  potassium chloride    Tablet ER 20 milliEquivalent(s) Oral daily  senna 2 Tablet(s) Oral at bedtime  tamsulosin 0.4 milliGRAM(s) Oral at bedtime      Physical Exam:    Neuro: AAOX3.  Non focal.    Pulm: Diminished BLL.  + PB to waterseal without airleak.      CV: RRR.  +S1+S2.  +PW (isolated)    Abd: Soft/NT/ND.  +BS.     Extremities:   No edema.  +pp    Incision(s): MSI with mepilex.  Sternum stable.                           PAST MEDICAL & SURGICAL HISTORY:  HTN (hypertension)      HLD (hyperlipidemia)      BPH without obstruction/lower urinary tract symptoms      DM2 (diabetes mellitus, type 2)      S/P bilateral inguinal hernia repair      S/P appendectomy                
  POD #3 s/p CABG X 3 (LIMA-LAD , SVG-OM, SVG- PDA)    PAST MEDICAL & SURGICAL HISTORY:  HTN (hypertension)  HLD (hyperlipidemia)  BPH without obstruction/lower urinary tract symptoms  DM2 (diabetes mellitus, type 2)  S/P bilateral inguinal hernia repair  S/P appendectomy    FAMILY HISTORY:  No pertinent family history    Brief Hospital Course: 77 year old Male with a medical history of HTN, HLD, BPH, intention tremor, and type 2 DM (HA1c 6.8 on Metformin, Januvia, Glipizide) who was seen by his Cardiologist outpatient for chest pain with minimal exertion, radiating down b/l arms, relieved by rest, s/p elective LHC/RHC at [facility] 3/20/23 revealing Multivessel CAD, with patient transferred to [facility] for CABG evaluation 3/20/23. Patient underwent CABG x 3 with Dr. Goel 3/21/23. Postoperative course significant for ABLA (remains stable, s/p 1u PRBC 3/23).      Significant recent/past 24 hr events: No overnight events reported.    Subjective: Patient lying in bed in NAD. +Tolerating diet. +Passing gas. +Pain currently controlled. Denies fevers, chills, lightheadedness, dizziness, HA, CP, palpitations, SOB, cough, abdominal pain, N/V, diarrhea, numbness/tingling in extremities, or any other acute complaints. ROS negative x 10 systems except as noted above.    MEDICATIONS  (STANDING):  aspirin enteric coated 81 milliGRAM(s) Oral daily  atorvastatin 40 milliGRAM(s) Oral at bedtime  bisacodyl Suppository 10 milliGRAM(s) Rectal once  chlorhexidine 2% Cloths 1 Application(s) Topical <User Schedule>  enoxaparin Injectable 40 milliGRAM(s) SubCutaneous every 24 hours  insulin glargine Injectable (LANTUS) 18 Unit(s) SubCutaneous at bedtime  insulin lispro (ADMELOG) corrective regimen sliding scale   SubCutaneous Before meals and at bedtime  insulin lispro Injectable (ADMELOG) 4 Unit(s) SubCutaneous three times a day before meals  metoprolol tartrate 50 milliGRAM(s) Oral every 8 hours  pantoprazole    Tablet 40 milliGRAM(s) Oral daily  polyethylene glycol 3350 17 Gram(s) Oral daily  senna 2 Tablet(s) Oral at bedtime  tamsulosin 0.4 milliGRAM(s) Oral at bedtime    MEDICATIONS  (PRN):  acetaminophen     Tablet .. 650 milliGRAM(s) Oral every 6 hours PRN Mild Pain (1 - 3)  dextrose Oral Gel 15 Gram(s) Oral once PRN Blood Glucose LESS THAN 70 milliGRAM(s)/deciliter  oxyCODONE    IR 5 milliGRAM(s) Oral every 6 hours PRN Moderate Pain (4 - 6)  oxyCODONE    IR 10 milliGRAM(s) Oral every 6 hours PRN Severe Pain (7 - 10)    Allergies:  chocolate (Rash)  No Known Drug Allergies    Vitals   T(C): 36.8 (24 Mar 2023 00:31), Max: 37.1 (23 Mar 2023 06:00)  T(F): 98.2 (24 Mar 2023 00:31), Max: 98.8 (23 Mar 2023 06:00)  HR: 105 (24 Mar 2023 02:50) (98 - 115)  BP: 151/86 (24 Mar 2023 02:50) (126/67 - 181/87)  BP(mean): 112 (23 Mar 2023 20:00) (88 - 124)  ABP: 129/56 (23 Mar 2023 17:00) (119/48 - 162/69)  ABP(mean): 80 (23 Mar 2023 17:00) (73 - 99)  RR: 18 (24 Mar 2023 00:31) (17 - 27)  SpO2: 96% (24 Mar 2023 00:31) (93% - 99%)  O2 Parameters below as of 24 Mar 2023 00:31  Patient On (Oxygen Delivery Method): nasal cannula  O2 Flow (L/min): 2    I&O's Detail    22 Mar 2023 07:01  -  23 Mar 2023 07:00  --------------------------------------------------------  IN:    Albumin 5%  - 250 mL: 250 mL    Insulin: 30 mL    IV PiggyBack: 150 mL    IV PiggyBack: 100 mL    IV PiggyBack: 500 mL    PRBCs (Packed Red Blood Cells): 350 mL    sodium chloride 0.9%: 240 mL  Total IN: 1620 mL    OUT:    Blood Loss (mL): 0 mL    Chest Tube (mL): 20 mL    Chest Tube (mL): 140 mL    Chest Tube (mL): 300 mL    Indwelling Catheter - Urethral (mL): 1280 mL    NiCARdipine: 0 mL  Total OUT: 1740 mL    Total NET: -120 mL      23 Mar 2023 07:01  -  24 Mar 2023 05:22  --------------------------------------------------------  IN:    Oral Fluid: 150 mL    sodium chloride 0.9%: 120 mL  Total IN: 270 mL    OUT:    Chest Tube (mL): 10 mL    Chest Tube (mL): 20 mL    Chest Tube (mL): 30 mL    Indwelling Catheter - Urethral (mL): 2600 mL    Insulin: 0 mL  Total OUT: 2660 mL    Total NET: -2390 mL    Physical Exam  Neuro: A+O x 3, non-focal, speech clear and intact  HEENT:  NCAT, No conjuctival edema or icterus, no thrush.    Neck:  Supple, trachea midline  Pulm: +Diminished BSs at bases bilaterally, no accessory muscle use noted  Chest:        mediastinal CT and left pleural CT all with dressings intact and no air leak, no subQ emphysema       +PW (A and V wires isolated)  CV: tachy rate, regular rhythm, +S1S2, no murmur noted  Abd: softly distended, NT, + BS  : henley in situ to bedside drainage  Ext: HECK x 4, no edema, no cyanosis, distal motor/neuro/circ intact  Skin: warm, dry, perfused  Incisions: midsternal mepilex C/D/I, sternum stable, B/l LE vein harvest sites C/D/I w/ dressing and Ace wrap    LABS                        10.4   14.53 )-----------( 174      ( 23 Mar 2023 10:23 )             29.6     03-23    138  |  103  |  21.7<H>  ----------------------------<  172<H>  4.1   |  23.0  |  1.19    Ca    8.4      23 Mar 2023 10:23  Mg     2.4     03-23    TPro  6.5<L>  /  Alb  3.8  /  TBili  0.6  /  DBili  0.2  /  AST  23  /  ALT  12  /  AlkPhos  44  03-23    POCT Blood Glucose.: 148 mg/dL (03-23-23 @ 21:17)  POCT Blood Glucose.: 163 mg/dL (03-23-23 @ 17:50)  POCT Blood Glucose.: 202 mg/dL (03-23-23 @ 11:45)  POCT Blood Glucose.: 163 mg/dL (03-23-23 @ 08:17)      Last CXR:  < from: Xray Chest 1 View- PORTABLE-Routine (03.23.23 @ 05:57) >  IMPRESSION: Left base infiltrate slowly improving. [place] catheter removed. Nonspecific bowel gas pattern.  < end of copied text >  
Patient seen and examined.  Denies CP, SOB, N/V. Extubated overnight.  Failed first dysphagia trial.  No other acute events.     T(C): 36.8 (03-22-23 @ 01:00)  T(F): 98.2 (03-22-23 @ 01:00)  HR: 79 (03-22-23 @ 01:00)  BP: 109/57 (03-22-23 @ 01:00)  BP(mean): 75 (03-22-23 @ 01:00)  ABP: 109/47 (03-22-23 @ 01:00)  ABP(mean): 66 (03-22-23 @ 01:00)  RR: 13 (03-22-23 @ 01:00)  SpO2: 98% (03-22-23 @ 01:00)  Wt(kg): --  CVP(mm Hg): 13 (03-22-23 @ 01:00)  CI: 2.2 (03-22-23 @ 01:00)  PA: 31/17 (03-22-23 @ 01:00)  PA(mean): 23 (03-22-23 @ 01:00)  PA(direct): --  SVRI: 1924 (03-22-23 @ 01:00)  Mode: CPAP with PS, FiO2: 40, PEEP: 5, PS: 5, MAP: 8, PIP: 11    Physical Exam:  Gen: A&Ox3  Pulm:  CTA b/l, no r/r/w  CV:  S1S2, RRR, no m/r/g  Abd: +BS, soft, NT, ND  Ext:  +DP b/l, no c/c/e  Incision:  c/d/i no click, no exudate    I&O's Detail    21 Mar 2023 07:01  -  22 Mar 2023 01:15  --------------------------------------------------------  IN:    Albumin 5%  - 250 mL: 250 mL    Insulin: 41.5 mL    IV PiggyBack: 100 mL    IV PiggyBack: 250 mL    IV PiggyBack: 350 mL    IV PiggyBack: 100 mL    Lactated Ringers Bolus: 500 mL    NiCARdipine: 95 mL    Norepinephrine: 4 mL    sodium chloride 0.9%: 130 mL  Total IN: 1820.5 mL    OUT:    Chest Tube (mL): 270 mL    Chest Tube (mL): 110 mL    Chest Tube (mL): 120 mL  Chest tubes without airleaks     Indwelling Catheter - Urethral (mL): 455 mL    Voided (mL): 1510 mL  Total OUT: 2465 mL    Total NET: -644.5 mL                              10.5   19.95 )-----------( 226      ( 21 Mar 2023 13:15 )             29.3   03-21    142  |  105  |  11.1  ----------------------------<  156<H>  4.0   |  21.0<L>  |  0.89    Ca    8.6      21 Mar 2023 13:15  Mg     2.4     03-21    TPro  5.9<L>  /  Alb  3.5  /  TBili  1.1  /  DBili  x   /  AST  25  /  ALT  12  /  AlkPhos  43  03-21  aPTT: x    ; PT: 15.6 sec; INR: 1.34 ratio  03-21-23 @ 20:15       ABG - ( 21 Mar 2023 23:17 )  pH: 7.400 /  pCO2: 39    /  pO2: 80    / HCO3: 24    / Base Excess: -0.6  /  SaO2: 98.2 /  Lactate: x        CAPILLARY BLOOD GLUCOSE      POCT Blood Glucose.: 98 mg/dL (22 Mar 2023 01:01)        Medications:  aspirin enteric coated 81 milliGRAM(s) Oral daily  atorvastatin 40 milliGRAM(s) Oral at bedtime  cefuroxime  IVPB 1500 milliGRAM(s) IV Intermittent every 8 hours  chlorhexidine 2% Cloths 1 Application(s) Topical <User Schedule>  dextrose 5%. 1000 milliLiter(s) IV Continuous <Continuous>  dextrose 5%. 1000 milliLiter(s) IV Continuous <Continuous>  dextrose 50% Injectable 50 milliLiter(s) IV Push every 15 minutes  dextrose 50% Injectable 25 milliLiter(s) IV Push every 15 minutes  dextrose Oral Gel 15 Gram(s) Oral once PRN  enoxaparin Injectable 40 milliGRAM(s) SubCutaneous every 24 hours  fentaNYL    Injectable 50 MICROGram(s) IV Push every 15 minutes PRN  glucagon  Injectable 1 milliGRAM(s) IntraMuscular once  insulin lispro Injectable (ADMELOG) 2 Unit(s) SubCutaneous three times a day before meals  insulin regular Infusion 2 Unit(s)/Hr IV Continuous <Continuous>  niCARdipine Infusion 5 mG/Hr IV Continuous <Continuous>  norepinephrine Infusion 0.05 MICROgram(s)/kG/Min IV Continuous <Continuous>  pantoprazole    Tablet 40 milliGRAM(s) Oral daily  polyethylene glycol 3350 17 Gram(s) Oral daily  potassium chloride  10 mEq/50 mL IVPB 10 milliEquivalent(s) IV Intermittent every 1 hour  potassium chloride  10 mEq/50 mL IVPB 10 milliEquivalent(s) IV Intermittent every 1 hour  potassium chloride  10 mEq/50 mL IVPB 10 milliEquivalent(s) IV Intermittent every 1 hour  propofol Infusion 20 MICROgram(s)/kG/Min IV Continuous <Continuous>  senna 2 Tablet(s) Oral at bedtime  sodium chloride 0.9%. 1000 milliLiter(s) IV Continuous <Continuous>  tamsulosin 0.4 milliGRAM(s) Oral at bedtime  vancomycin  IVPB 1000 milliGRAM(s) IV Intermittent every 12 hours

## 2023-03-27 NOTE — PROGRESS NOTE ADULT - PROBLEM SELECTOR PLAN 1
POD 1 S/P cabg x3    start Beta blocker as tolerated by HR and SBP  Lipitor for chronic graft patency.  Aspirin for acute graft closure  Encourage PO intake is passes repeat dysphagia test  Encourage OOB to chair and ambulation with PT  Encourage deep breathing exercised and coughing  Chest PT and IS use with bedside nurse  D/C Terrell   D/C substernal tube later today    Plan discussed with Dr. Goel
POD 2 S/P cabg x3    continue Beta blocker as tolerated by HR and SBP  Lipitor for chronic graft patency.  Aspirin for acute graft closure  Encourage PO intake is passes repeat dysphagia test  Encourage OOB to chair and ambulation with PT  Encourage deep breathing exercises and coughing  Chest PT and IS use with bedside nurse    D/C substernal and left tube later today  IM AM will remove cordis, arterial line and henley     Plan discussed with Dr. Goel
S/p CABG x 3 with Dr. Goel 3/21/23.  Neurologically intact.  Hemodynamically stable.  Continue lopressor as tolerated by HR and BP, increase dose as able.   Continue aspirin for acute graft closure prophylaxis.  Continue statin for chronic graft patency prophylaxis.  Oxygenating well, wean FiO2 as tolerated, coughing and deep breathing exercises/incentive spirometry encouraged.  D/c 2 CTs this morning if output remains minimal.   Continue diuresis PRN, monitor strict I/Os, replenish electrolytes PRN to keep K>4 and Mg>2.   Continue with PT, increase activity as tolerated.  FS AC+HS with coverage for glycemic control.  Tylenol, Oxy PRN for analgesia.  Continue bowel regimen PRN constipation.   Case and plan to be reviewed / discussed with CT Surgery attending / team during AM rounds.
S/p CABG x 3 with Dr. Goel 3/21/23.  Neurologically intact. Hemodynamically stable.  Continue lopressor as tolerated by HR and BP, increase dose as able.   Continue aspirin for acute graft closure prophylaxis.  Continue statin for chronic graft patency prophylaxis.  Oxygenating well, coughing and deep breathing exercises/incentive spirometry encouraged.  Continue diuresis with Lasix 20mg PO QD. Monitor strict I/Os, replenish electrolytes PRN to keep K>4 and Mg>2.   Encourage increased activity as tolerated.  FS AC+HS with coverage for glycemic control.  Tylenol, Oxy PRN for analgesia.  Continue bowel regimen PRN constipation.   Case and plan to be reviewed / discussed with CT Surgery attending / team during AM rounds.
s/p C3L 3/21  Continue ASA, statin, lopressor.  Cont low dose lasix.   Encourage CDBE/IS and increased mobilization.  Physical therapy.  Removed remaining PB and PW today.  Candlearia dc'd and pt voiding.  Plan likely for discharge home tomorrow.  Discussed with Dr. Goel.
S/p CABG x 3 with Dr. Goel 3/21/23.  Neurologically intact. Hemodynamically stable.  Continue lopressor as tolerated by HR and BP, increase dose as able.   Continue aspirin for acute graft closure prophylaxis.  Continue statin for chronic graft patency prophylaxis.  Oxygenating well, coughing and deep breathing exercises/incentive spirometry encouraged.  Continue diuresis with Lasix 20mg PO QD. Monitor strict I/Os, replenish electrolytes PRN to keep K>4 and Mg>2.   Encourage increased activity as tolerated.  FS AC+HS with coverage for glycemic control.  Tylenol, Oxy PRN for analgesia.  Continue bowel regimen PRN constipation.   Case and plan to be reviewed / discussed with CT Surgery attending / team during AM rounds.

## 2023-03-27 NOTE — DISCHARGE NOTE NURSING/CASE MANAGEMENT/SOCIAL WORK - PATIENT PORTAL LINK FT
You can access the FollowMyHealth Patient Portal offered by [facility] by registering at the following website: [email or web address]. By joining [facility]’s FollowMyHealth portal, you will also be able to view your health information using other applications (apps) compatible with our system.

## 2023-03-27 NOTE — PROGRESS NOTE ADULT - PROBLEM SELECTOR PLAN 4
Continue lopressor as tolerated by HR and BP, increase dose as able.
Continue lopressor as tolerated by HR and BP, increase dose as able.
Lopressor as BP and HR allow  Monitor BP
Lopressor as BP and HR allow  Monitor BP
Continue lopressor as tolerated by HR and BP.
Continue lopressor as tolerated by HR and BP.

## 2023-03-27 NOTE — PROGRESS NOTE ADULT - ASSESSMENT
77M with PMHx of HTN, HLD, BPH, and NIDDM2 who was seen by his cardiologist outpatient for chest pain work up. Pt is s/p elective cardiac cath at [facility] 3/20 showing MVD. Pt transferred to [facility] for CABG evaluation. S/p CABG.    1. T2DM, a1c 6.8%- well controlled on oral regimen  - continue metformin 500mg bid with meals  - continue tradjenta 5mg daily  - Correctional sliding scale  - would discharge on above oral meds    2. CAD  - S/p CABG, care per CT surgery    3. HLD  - Continue statin    will sign off   please call with questions or concerns
Pt is a 78yo Male with PMHx of HTN, HLD, BPH, and NIDDM2 who was seen by his Cardiologist outpatient for chest pain work-up. Pt states that he has been experiencing chest pain with minimal exertion rated 1/10, dull, radiating down b/l arms, relieved by rest. Denied any other associated symptoms. Patient states he has otherwise been in good health. Pt is s/p elective LHC/RHC at [facility] 3/20 showing MVD. Pt transferred to [facility] for CABG evaluation. 3/21 underwent CABG x3 with Dr Goel.  He was exubated POD 0.  
Pt is a 78yo Male with PMHx of HTN, HLD, BPH, and NIDDM2 who was seen by his Cardiologist outpatient for chest pain work-up. Pt states that he has been experiencing chest pain with minimal exertion rated 1/10, dull, radiating down b/l arms, relieved by rest. Denied any other associated symptoms. Patient states he has otherwise been in good health. Pt is s/p elective LHC/RHC at [facility] 3/20 showing MVD. Pt transferred to [facility] for CABG evaluation. 3/21 underwent CABG x3 with Dr Goel.  He was exubated POD 0.  
78 y/o M with PMHx of HTN, HLD, BPH, and NIDDM2 who was seen by his cardiologist outpatient for chest pain work up. Pt is s/p elective cardiac cath at [facility] 3/20 showing MVD. Pt transferred to [facility] for CABG evaluation. S/p CABG.    1. T2DM, a1c 6.8%  - Discontinue premeal admelog and decrease lantus to 10 units qhs  - Start metformin 500mg bid with meals  - Start tradjenta 5mg daily  - Correctional sliding scale  - Discharge recs to be determined  - D/w CT surgery PA    2. CAD  - S/p CABG, care per CT surgery    3. HLD  - Continue statin    
77 year old Male with a medical history of HTN, HLD, BPH,  intention tremor, and type 2 DM (HA1c 6.8 on Metformin, Januvia, Glipizide) who was seen by his Cardiologist outpatient for chest pain with minimal exertion, radiating down b/l arms, relieved by rest, s/p elective LHC/RHC at [facility] 3/20/23 revealing Multivessel CAD, with patient transferred to [facility] for CABG evaluation 3/20/23. Patient underwent CABG x 3 with Dr. Goel 3/21/23. Postoperative course significant for ABLA (remains stable, s/p 1u PRBC 3/23).  
78 y/o M with PMHx of HTN, HLD, BPH, and NIDDM2 who was seen by his cardiologist outpatient for chest pain work up. Pt is s/p elective cardiac cath at [facility] 3/20 showing MVD. Pt transferred to [facility] for CABG evaluation. S/p CABG.    1. T2DM, a1c 6.8%  - Continue lantus 18 units qhs  - Continue premeal admelog 4 units tid  - Correctional sliding scale    2. CAD  - S/p CABG, care per CT surgery    3. HLD  - Continue statin    
77 year old Male with a medical history of HTN, HLD, BPH,  intention tremor, and type 2 DM (HA1c 6.8 on Metformin, Januvia, Glipizide) who was seen by his Cardiologist outpatient for chest pain with minimal exertion, radiating down b/l arms, relieved by rest, s/p elective LHC/RHC at [facility] 3/20/23 revealing Multivessel CAD, with patient transferred to [facility] for CABG evaluation 3/20/23. Patient underwent CABG x 3 with Dr. Goel 3/21/23. Postoperative course significant for ABLA (remains stable, s/p 1u PRBC 3/23).  
77 year old Male with a medical history of HTN, HLD, BPH,  intention tremor, and type 2 DM (HA1c 6.8 on Metformin, Januvia, Glipizide) who was seen by his Cardiologist outpatient for chest pain with minimal exertion, radiating down b/l arms, relieved by rest, s/p elective LHC/RHC at [facility] 3/20/23 revealing Multivessel CAD, with patient transferred to [facility] for CABG evaluation 3/20/23. Patient underwent CABG x 3 with Dr. Goel 3/21/23. Postoperative course significant for ABLA (remains stable, s/p 1u PRBC 3/23).  
77 year old Male with a medical history of HTN, HLD, BPH,  intention tremor, and type 2 DM (HA1c 6.8 on Metformin, Januvia, Glipizide) who was seen by his Cardiologist outpatient for chest pain with minimal exertion, radiating down b/l arms, relieved by rest, s/p elective LHC/RHC at [facility] 3/20/23 revealing Multivessel CAD, with patient transferred to [facility] for CABG evaluation 3/20/23. Patient underwent CABG x 3 with Dr. Goel 3/21/23. Postoperative course significant for ABLA (remains stable, s/p 1u PRBC 3/23).

## 2023-03-27 NOTE — PROGRESS NOTE ADULT - PROVIDER SPECIALTY LIST ADULT
Critical Care
Endocrinology
CT Surgery

## 2023-03-28 ENCOUNTER — NON-APPOINTMENT (OUTPATIENT)
Age: 78
End: 2023-03-28

## 2023-03-28 RX ORDER — FUROSEMIDE 20 MG/1
20 TABLET ORAL DAILY
Qty: 7 | Refills: 0 | Status: ACTIVE | COMMUNITY
Start: 2023-03-28 | End: 1900-01-01

## 2023-03-28 RX ORDER — TAMSULOSIN HYDROCHLORIDE 0.4 MG/1
0.4 CAPSULE ORAL
Qty: 30 | Refills: 0 | Status: ACTIVE | COMMUNITY
Start: 2023-03-28 | End: 1900-01-01

## 2023-03-28 RX ORDER — OMEPRAZOLE 40 MG/1
40 CAPSULE, DELAYED RELEASE ORAL
Qty: 30 | Refills: 0 | Status: ACTIVE | COMMUNITY
Start: 2023-03-28 | End: 1900-01-01

## 2023-03-28 RX ORDER — SENNOSIDES 8.6 MG TABLETS 8.6 MG/1
8.6 TABLET ORAL
Qty: 60 | Refills: 0 | Status: ACTIVE | COMMUNITY
Start: 2023-03-28 | End: 1900-01-01

## 2023-03-28 RX ORDER — ATORVASTATIN CALCIUM 20 MG/1
20 TABLET, FILM COATED ORAL DAILY
Qty: 90 | Refills: 0 | Status: ACTIVE | COMMUNITY
Start: 2023-03-28 | End: 1900-01-01

## 2023-03-28 RX ORDER — ACETAMINOPHEN 325 MG/1
325 TABLET ORAL EVERY 6 HOURS
Qty: 30 | Refills: 0 | Status: ACTIVE | COMMUNITY
Start: 2023-03-28 | End: 1900-01-01

## 2023-03-28 RX ORDER — POTASSIUM CHLORIDE 1500 MG/1
20 TABLET, FILM COATED, EXTENDED RELEASE ORAL
Qty: 7 | Refills: 0 | Status: ACTIVE | COMMUNITY
Start: 2023-03-28 | End: 1900-01-01

## 2023-03-28 RX ORDER — METOPROLOL TARTRATE 100 MG/1
100 TABLET, FILM COATED ORAL
Qty: 60 | Refills: 2 | Status: ACTIVE | COMMUNITY
Start: 2023-03-28 | End: 1900-01-01

## 2023-03-28 RX ORDER — ASPIRIN ENTERIC COATED TABLETS 81 MG 81 MG/1
81 TABLET, DELAYED RELEASE ORAL DAILY
Qty: 30 | Refills: 0 | Status: ACTIVE | COMMUNITY
Start: 2023-03-28 | End: 1900-01-01

## 2023-03-30 ENCOUNTER — APPOINTMENT (OUTPATIENT)
Dept: CARE COORDINATION | Facility: HOME HEALTH | Age: 78
End: 2023-03-30
Payer: COMMERCIAL

## 2023-03-30 VITALS
OXYGEN SATURATION: 99 % | HEIGHT: 65 IN | BODY MASS INDEX: 26.16 KG/M2 | RESPIRATION RATE: 16 BRPM | DIASTOLIC BLOOD PRESSURE: 78 MMHG | WEIGHT: 157 LBS | SYSTOLIC BLOOD PRESSURE: 122 MMHG | HEART RATE: 90 BPM

## 2023-03-30 PROCEDURE — 99024 POSTOP FOLLOW-UP VISIT: CPT

## 2023-03-30 RX ORDER — METFORMIN HYDROCHLORIDE 1000 MG/1
1000 TABLET, FILM COATED, EXTENDED RELEASE ORAL TWICE DAILY
Refills: 0 | Status: ACTIVE | COMMUNITY

## 2023-03-30 RX ORDER — METFORMIN HYDROCHLORIDE 500 MG/1
500 TABLET, COATED ORAL 3 TIMES DAILY
Qty: 90 | Refills: 0 | Status: DISCONTINUED | COMMUNITY
Start: 2023-03-28 | End: 2023-03-30

## 2023-03-30 NOTE — ASSESSMENT
[FreeTextEntry1] : Pt recovering well at home s/p OHS. Reviewed all medications and dosages with pt understanding. Pt has all medications in home and is taking as prescribed. Pain controlled with current medication regimen. No new symptoms, issues or concerns to report at this time.\par BG has been elevated 200's, pt received Trajenta RX today, took for the first time. Repeat from this AM is 210. Pt had been on higher dose of Metformin prior to surgery. pt to resume Metformin XR 1000 mg every 12 hrs with meals as 500 mg TID is not treating BG effectively. Pt to call NP with tomorrow AM BG reading. MOHIT Almanzar made aware of changes. Will refer to ENDO if BG doesn't decrease. Pt also advised to cleanse finger and use second drop to ensure accurate testing.

## 2023-03-30 NOTE — REASON FOR VISIT
[Follow-Up: _____] : a [unfilled] follow-up visit [Spouse] : spouse [FreeTextEntry1] : FOLLOW YOUR HEART- Transitional Care Management Program- [facility]\par \par

## 2023-03-30 NOTE — PHYSICAL EXAM
[Sclera] : the sclera and conjunctiva were normal [Neck Appearance] : the appearance of the neck was normal [Respiration, Rhythm And Depth] : normal respiratory rhythm and effort [Exaggerated Use Of Accessory Muscles For Inspiration] : no accessory muscle use [Auscultation Breath Sounds / Voice Sounds] : lungs were clear to auscultation bilaterally [Apical Impulse] : the apical impulse was normal [Heart Rate And Rhythm] : heart rate was normal and rhythm regular [Heart Sounds] : normal S1 and S2 [Murmurs] : no murmurs [Chest Visual Inspection Thoracic Asymmetry] : no chest asymmetry [1+] : left 1+ [Bowel Sounds] : normal bowel sounds [Abdomen Soft] : soft [Abdomen Tenderness] : non-tender [Abnormal Walk] : normal gait [Skin Color & Pigmentation] : normal skin color and pigmentation [Skin Turgor] : normal skin turgor [] : no rash [Oriented To Time, Place, And Person] : oriented to person, place, and time [FreeTextEntry2] : B/L LE with trace pedal edema, B/L calves soft, NT [FreeTextEntry1] : b/l SVG harvest sites without erythema, warmth or drainage. Edges well approximated. Resolving soft, NT ecchymosis to thigh area [Impaired Insight] : insight and judgment were intact [Affect] : the affect was normal

## 2023-03-30 NOTE — HISTORY OF PRESENT ILLNESS
[FreeTextEntry1] : 77 year old Male with a medical history of HTN, HLD, BPH,  intention tremor, \par and type 2 DM (HA1c 6.8 on Metformin, Januvia, Glipizide) who was seen by his \par Cardiologist outpatient for chest pain with minimal exertion, radiating down \par b/l arms, relieved by rest, s/p elective LHC/RHC at [facility] 3/20/23 \par revealing Multivessel CAD, with patient transferred to [facility] for CABG evaluation \par 3/20/23. Patient underwent CABG x 3 with Dr. Goel 3/21/23. Postoperative \par course significant for ABLA (remains stable, s/p 1u PRBC 3/23). Pt remained hemodynamically stable and discharged home with support of spouse/family, home care services and the [facility] team. Initial visit completed in home\par CC "I'm doing ok"\par

## 2023-03-31 PROBLEM — Z95.1 S/P CABG (CORONARY ARTERY BYPASS GRAFT): Status: ACTIVE | Noted: 2023-03-28

## 2023-03-31 NOTE — CONSULT LETTER
[Dear  ___] : Dear  [unfilled], [Courtesy Letter:] : I had the pleasure of seeing your patient, [unfilled], in my office today. [Please see my note below.] : Please see my note below. [Consult Closing:] : Thank you very much for allowing me to participate in the care of this patient.  If you have any questions, please do not hesitate to contact me. [Sincerely,] : Sincerely, [FreeTextEntry2] : Matthew Fink MD [FreeTextEntry3] : Ahmet Goel MD\par Chief of Cardiovascular and Thoracic Surgery\par System Director of Endovascular and Cardiovascular Surgery\par  \par Cardiovascular and Thoracic Medicine\par [employer] [facility] [employer]\par [facility] \par [address] [place] [address]\[place] [address]\par \par

## 2023-03-31 NOTE — REASON FOR VISIT
[de-identified] : CABG x 3 (LIMA-LAD, SVG-dRCA, SVG-OM2) [de-identified] : 03/21/23 [de-identified] : Uneventful post operative course

## 2023-04-06 ENCOUNTER — TRANSCRIPTION ENCOUNTER (OUTPATIENT)
Age: 78
End: 2023-04-06

## 2023-04-07 ENCOUNTER — APPOINTMENT (OUTPATIENT)
Dept: CARDIOTHORACIC SURGERY | Facility: CLINIC | Age: 78
End: 2023-04-07

## 2023-04-07 DIAGNOSIS — Z95.1 PRESENCE OF AORTOCORONARY BYPASS GRAFT: ICD-10-CM

## 2023-04-20 ENCOUNTER — APPOINTMENT (OUTPATIENT)
Dept: ENDOCRINOLOGY | Facility: CLINIC | Age: 78
End: 2023-04-20

## 2023-04-20 DIAGNOSIS — E11.9 TYPE 2 DIABETES MELLITUS W/OUT COMPLICATIONS: ICD-10-CM

## 2023-09-27 ENCOUNTER — APPOINTMENT (OUTPATIENT)
Dept: OPHTHALMOLOGY | Facility: CLINIC | Age: 78
End: 2023-09-27
Payer: COMMERCIAL

## 2023-09-27 ENCOUNTER — NON-APPOINTMENT (OUTPATIENT)
Age: 78
End: 2023-09-27

## 2023-09-27 PROCEDURE — 92134 CPTRZ OPH DX IMG PST SGM RTA: CPT

## 2023-09-27 PROCEDURE — 92014 COMPRE OPH EXAM EST PT 1/>: CPT

## 2025-08-07 ENCOUNTER — NON-APPOINTMENT (OUTPATIENT)
Age: 80
End: 2025-08-07

## 2025-08-07 ENCOUNTER — APPOINTMENT (OUTPATIENT)
Dept: OPHTHALMOLOGY | Facility: CLINIC | Age: 80
End: 2025-08-07
Payer: MEDICARE

## 2025-08-07 PROCEDURE — 92134 CPTRZ OPH DX IMG PST SGM RTA: CPT

## 2025-08-07 PROCEDURE — 92004 COMPRE OPH EXAM NEW PT 1/>: CPT

## (undated) DEVICE — GLV 7.5 PROTEXIS (WHITE)

## (undated) DEVICE — GOWN TRIMAX LG

## (undated) DEVICE — SYNOVIS VASCULAR PROBE 1.5MM 15CM

## (undated) DEVICE — PACING CABLE A/V TEMP SCREW DOWN 6FT

## (undated) DEVICE — SYS VEIN HARVESTING VIRTUOSAPH PLUS W/ RADIAL

## (undated) DEVICE — VESSEL LOOP MAXI-RED  0.120" X 16"

## (undated) DEVICE — SUT PROLENE 6-0 30" C-1

## (undated) DEVICE — SUT PROLENE 4-0 30" SH-1

## (undated) DEVICE — SOL INJ NS 0.9% 1000ML

## (undated) DEVICE — PRESSURE INFUSOR BAG 1000ML

## (undated) DEVICE — AORTIC PUNCH 5MM STANDARD HANDLE

## (undated) DEVICE — SPONGE PEANUT AUTO COUNT

## (undated) DEVICE — SUT PROLENE 8-0 24" BV175-6

## (undated) DEVICE — SUT VICRYL 0 54" REEL UNDYED

## (undated) DEVICE — VISITEC 4X4

## (undated) DEVICE — SUT ETHIBOND 1 30" OS6

## (undated) DEVICE — SUT ETHIBOND 5 4-30" CCS

## (undated) DEVICE — SUT VICRYL 1 36" CTX UNDYED

## (undated) DEVICE — DRSG MEPILEX 10 X 30CM (4 X 12") AG

## (undated) DEVICE — DRSG TAPE TRANSPORE 3"

## (undated) DEVICE — SUCTION TUBE CARDIAC FRAZIER 6FR SHAFT 3"

## (undated) DEVICE — AORTIC PUNCH 4.0MM STANDARD HANDLE

## (undated) DEVICE — PACING CABLE (BLUE) ATRIAL TEMP SCREW DOWN 12FT

## (undated) DEVICE — SYR LUER LOK 20CC

## (undated) DEVICE — SUT SILK 0 30" TIES

## (undated) DEVICE — SUT ETHIBOND 3-0 36" RB-1

## (undated) DEVICE — SUT VICRYL 0 36" CT-1 UNDYED

## (undated) DEVICE — MEDICATION LABELS W MARKER

## (undated) DEVICE — TOURNIQUET SET TOURNIKWIK 12FR (4 TUBES, 1 SNARE) 7.5"

## (undated) DEVICE — SUT SOFSILK 4-0 24" CV-15

## (undated) DEVICE — TRAY IRRIGATION SYR BULB 60CC

## (undated) DEVICE — SUT SILK 5-0 60" TIES

## (undated) DEVICE — SUT MONOCRYL 3-0 27" PS-2 UNDYED

## (undated) DEVICE — SUT PERMAHAND SILK 2 60" TIES

## (undated) DEVICE — DRAPE 3/4 SHEET 52X76"

## (undated) DEVICE — SOL ANTI FOG

## (undated) DEVICE — DRSG TEGADERM 4X4.75"

## (undated) DEVICE — SUT VICRYL 2 27" TP-1 UNDYED

## (undated) DEVICE — GLV 7 PROTEXIS (BLUE)

## (undated) DEVICE — DRSG CURITY GAUZE SPONGE 4 X 4" 12-PLY

## (undated) DEVICE — PACK CARDIOPLEGIA TABLE LINE W WYE

## (undated) DEVICE — SUT VICRYL 3-0 27" CT-1

## (undated) DEVICE — GLV 6 PROTEXIS (WHITE)

## (undated) DEVICE — TEMP PROBE 30MM MYOCARDIAL

## (undated) DEVICE — SUT PROLENE 4-0 36" RB-1

## (undated) DEVICE — CONNECTOR STRAIGHT 3/8 X 1/2"

## (undated) DEVICE — GLV 6.5 PROTEXIS (WHITE)

## (undated) DEVICE — DRAPE SLUSH / WARMER 44 X 66"

## (undated) DEVICE — SUT BOOT STANDARD (ASSORTED) 5 PAIR

## (undated) DEVICE — ELCTR MULTIFUNCTION DEFIBRILLATION ELECTRODE EDGE SYSTEM ADULT

## (undated) DEVICE — SUT VICRYL 0 36" CTX UNDYED

## (undated) DEVICE — SUT PROLENE 5-0 30" RB-2

## (undated) DEVICE — BULLDOG SPRING CLIP 6MM SOFT/SOFT

## (undated) DEVICE — DRAPE CV 106" X 135"

## (undated) DEVICE — SAW BLADE STRYKER SAGITTAL SAFEDGE 40.5X47.5X0.64

## (undated) DEVICE — BEAVER BLADE MINI SHARP ALL ROUND (BLUE)

## (undated) DEVICE — TUBING TRUWAVE PRESSURE MALE/FEMALE 72"

## (undated) DEVICE — FRAZIER SUCTION TIP 10FR

## (undated) DEVICE — LAP PAD 18 X 18"

## (undated) DEVICE — SUT ETHIBOND 2-0 4-30" RB-1 WHITE

## (undated) DEVICE — ELCTR BOVIE PENCIL HANDPIECE ROCKER SWITCH 15FT

## (undated) DEVICE — GLV 7.5 SENSICARE W ALOE

## (undated) DEVICE — SUT PROLENE 3-0 36" SH

## (undated) DEVICE — DRSG OPSITE 2.5 X 2"

## (undated) DEVICE — SUT PLEDGET 9MM X 4MM X 1.5MM

## (undated) DEVICE — DRSG KLING 4"

## (undated) DEVICE — GOWN XL W TOWEL

## (undated) DEVICE — PRESSURE INFUSOR BAG 500ML

## (undated) DEVICE — SUT PROLENE 7-0 24" BV175-6

## (undated) DEVICE — GLV 8 PROTEXIS (WHITE)

## (undated) DEVICE — WARMING BLANKET DUO-THERM HYPER/HYPOTHERM ADULT

## (undated) DEVICE — SUT SILK 0 30" KS

## (undated) DEVICE — DRAPE TOWEL WHITE 17" X 24"

## (undated) DEVICE — SUT SILK 2 30" MH

## (undated) DEVICE — PACK OPEN HEART VAMP PLUS

## (undated) DEVICE — SUT VICRYL 2-0 27" CT-1 UNDYED

## (undated) DEVICE — GLV 7 PROTEXIS (WHITE)

## (undated) DEVICE — GLV 8.5 PROTEXIS (WHITE)

## (undated) DEVICE — TUBING IV SET SECOND 34" W/O LOK-BLUNT

## (undated) DEVICE — PACK VALVE

## (undated) DEVICE — SUT PROLENE 3-0 36" SH-1

## (undated) DEVICE — SUT GORETEX CV-4 (3-0) 36" TH-18

## (undated) DEVICE — NDL TAPR FR EYE 1/2 CIR 3

## (undated) DEVICE — SUCTION YANKAUER NO CONTROL VENT

## (undated) DEVICE — SUT BLUNT SZ 5

## (undated) DEVICE — MARKING PEN W RULER

## (undated) DEVICE — VENTING ADAPTER "Y" (RED/BLUE) 7.5"

## (undated) DEVICE — TUBING SUCTION 20FT

## (undated) DEVICE — PREP SCRUB BRUSH W CHG 4%

## (undated) DEVICE — SUT PROLENE 3-0 36" MH

## (undated) DEVICE — POSITIONER CARDIAC BUMP

## (undated) DEVICE — STOPCOCK 3 WAY W TUBE 35"

## (undated) DEVICE — SUT STAINLESS STEEL 5 18" SCC

## (undated) DEVICE — CLEANER FOR ELCTR TIP

## (undated) DEVICE — SUT DOUBLE 6 WIRE STERNAL

## (undated) DEVICE — SUT PROLENE 6-0 24" C-1

## (undated) DEVICE — DRAPE INSTRUMENT POUCH 6.75" X 11"

## (undated) DEVICE — NDL BLUNT 18G LOOP VESSEL MAXI WHITE

## (undated) DEVICE — SUT SILK 0 30" SH

## (undated) DEVICE — SUT PDS II 2-0 27" CT-1

## (undated) DEVICE — Device

## (undated) DEVICE — NDL PERC BASEPLT 18GX7CM

## (undated) DEVICE — DRSG OPSITE 13.75 X 4"

## (undated) DEVICE — SUT PROLENE 5-0 36" C-1

## (undated) DEVICE — BLOWER MISTER VIPER II

## (undated) DEVICE — SUT HOLDER INSERT FOR OCTOBASE STERNAL RETRACTOR

## (undated) DEVICE — DRAPE TOWEL BLUE 17" X 24"

## (undated) DEVICE — RANGER BLOOD/FLUID WARMING SET DISP

## (undated) DEVICE — STAPLER SKIN PROXIMATE

## (undated) DEVICE — CATH IV SAFE BC 24G X 0.75" (YELLOW)

## (undated) DEVICE — TUBING INSUFFLATION LAP FILTER 10FT

## (undated) DEVICE — STABILIZER HAND ASSISTANT ATTACHMENT W STABLESOFT 2S

## (undated) DEVICE — STOPCOCK 3 WAY W SWIVEL MALE LUER LOCK

## (undated) DEVICE — PREP CHLORAPREP HI-LITE ORANGE 26ML

## (undated) DEVICE — MEDTRONIC CLEARVIEW BLOWER MISTER KIT W TUBING SET

## (undated) DEVICE — AORTIC PUNCH 4.4MM LONG HANDLE

## (undated) DEVICE — PACING CABLE BI-V TEMP ALLIGATOR CLIP 12FT

## (undated) DEVICE — MAXI-FLO SUCTION CATHETER KIT 14FR STRAIGHT

## (undated) DEVICE — TOURNIQUET SET 12FR (1 RED, 1 BLUE, 1 SNARE) 7"

## (undated) DEVICE — CHEST DRAIN PLEUR-EVAC DRY/WET ADULT-PEDS SINGLE (QUICK)

## (undated) DEVICE — STEALTH CLAMP INSERT FIBRA/FIBRA 60MM

## (undated) DEVICE — SUT ETHIBOND 4-0 36" RB-1

## (undated) DEVICE — PHRENIC NERVE PAD MEDIUM

## (undated) DEVICE — SUT ETHIBOND 2-0 30" SH-1 GREEN/WHITE

## (undated) DEVICE — NDL HYPO SAFE 18G X 1.5" (PINK)

## (undated) DEVICE — SUT SILK 2-0 18" SH (POP-OFF)

## (undated) DEVICE — SUT ETHIBOND 2 30" V37

## (undated) DEVICE — SUT ETHIBOND 2-0 36" SH

## (undated) DEVICE — SWITCH ARISS TABLE MOUNT UNEQUAL LEGS 13"

## (undated) DEVICE — SOL IRR POUR NS 0.9% 500ML

## (undated) DEVICE — WOUND IRR IRRISEPT W 0.5 CHG

## (undated) DEVICE — SUT PROLENE 7-0 30" BV-1

## (undated) DEVICE — MEDTRONIC URCHIN EVO HEART POSITIONER & CANISTER TUBING SET

## (undated) DEVICE — SUT PROLENE 5-0 36" RB-1

## (undated) DEVICE — NDL COUNTER FOAM AND MAGNET 40-70

## (undated) DEVICE — SUT MONOCRYL 4-0 27" PS-2 UNDYED

## (undated) DEVICE — GETINGE VASOVIEW 7 ENDOSCOPIC VESSEL HARVESTING SYSTEM

## (undated) DEVICE — SUT PROLENE 7-0 24" BV-1

## (undated) DEVICE — DRAPE IOBAN 33" X 23"

## (undated) DEVICE — DRSG MEPILEX 10 X 10CM (4 X 4") AG

## (undated) DEVICE — SOL INJ NS 0.9% 500ML 1-PORT

## (undated) DEVICE — TUBING MEDI-VAC W MAXIGRIP CONNECTORS 1/4"X6'

## (undated) DEVICE — TONGUE DEPRESSOR

## (undated) DEVICE — SUT ETHIBOND 2-0 4-30" RB-1 GREEN

## (undated) DEVICE — ELCTR BOVIE BLADE 3/4" EXTENDED LENGTH 6"

## (undated) DEVICE — TUBING ALARIS PUMP MODULE NON-DEHP

## (undated) DEVICE — DRSG ALLEVYN LIFE SACRUM 6.75 X 6.75 (PINK)

## (undated) DEVICE — SENSOR MYOCARDIAL TEMP 15MM

## (undated) DEVICE — FOLEY TRAY 16FR 5CC LF LUBRISIL ADVANCE TEMP CLOSED

## (undated) DEVICE — SUT PROLENE 4-0 36" SH

## (undated) DEVICE — SAW BLADE STRYKER STERNUM 31MM X 6.27 X .79

## (undated) DEVICE — SUT ETHIBOND 2-0 30" V5 WHITE

## (undated) DEVICE — SOL IRR POUR H2O 500ML

## (undated) DEVICE — SET BLOOD Y-TYPE

## (undated) DEVICE — MULTIPLE PERFUSION SET FEMALE 1 INLET LEG W 4 LEGS 15" (BLUE/RED)

## (undated) DEVICE — DRSG TAPE UMBILICAL COTTON 2" X 30 X 1/8"

## (undated) DEVICE — GLV 6.5 PROTEXIS (BLUE)

## (undated) DEVICE — TUBING ATS SUCTION LINE